# Patient Record
Sex: MALE | Race: OTHER
[De-identification: names, ages, dates, MRNs, and addresses within clinical notes are randomized per-mention and may not be internally consistent; named-entity substitution may affect disease eponyms.]

---

## 2018-01-18 ENCOUNTER — HOSPITAL ENCOUNTER (INPATIENT)
Dept: HOSPITAL 72 - 2E | Age: 68
LOS: 7 days | Discharge: ANOTHER HOSPITAL | DRG: 314 | End: 2018-01-25
Payer: MEDICARE

## 2018-01-18 VITALS — HEIGHT: 71 IN | WEIGHT: 157.44 LBS | BODY MASS INDEX: 22.04 KG/M2

## 2018-01-18 VITALS — SYSTOLIC BLOOD PRESSURE: 141 MMHG | DIASTOLIC BLOOD PRESSURE: 57 MMHG

## 2018-01-18 VITALS — DIASTOLIC BLOOD PRESSURE: 93 MMHG | SYSTOLIC BLOOD PRESSURE: 140 MMHG

## 2018-01-18 VITALS — DIASTOLIC BLOOD PRESSURE: 75 MMHG | SYSTOLIC BLOOD PRESSURE: 132 MMHG

## 2018-01-18 DIAGNOSIS — I48.92: ICD-10-CM

## 2018-01-18 DIAGNOSIS — I34.0: ICD-10-CM

## 2018-01-18 DIAGNOSIS — E78.5: ICD-10-CM

## 2018-01-18 DIAGNOSIS — I50.43: ICD-10-CM

## 2018-01-18 DIAGNOSIS — E11.22: ICD-10-CM

## 2018-01-18 DIAGNOSIS — N18.9: ICD-10-CM

## 2018-01-18 DIAGNOSIS — F14.10: ICD-10-CM

## 2018-01-18 DIAGNOSIS — Z23: ICD-10-CM

## 2018-01-18 DIAGNOSIS — I82.501: ICD-10-CM

## 2018-01-18 DIAGNOSIS — I36.1: ICD-10-CM

## 2018-01-18 DIAGNOSIS — N17.9: ICD-10-CM

## 2018-01-18 DIAGNOSIS — I13.0: ICD-10-CM

## 2018-01-18 DIAGNOSIS — I47.2: ICD-10-CM

## 2018-01-18 DIAGNOSIS — I42.9: Primary | ICD-10-CM

## 2018-01-18 DIAGNOSIS — Z79.84: ICD-10-CM

## 2018-01-18 LAB
ADD MANUAL DIFF: NO
ALBUMIN SERPL-MCNC: 3.3 G/DL (ref 3.4–5)
ALBUMIN/GLOB SERPL: 0.6 {RATIO} (ref 1–2.7)
ALP SERPL-CCNC: 152 U/L (ref 46–116)
ALT SERPL-CCNC: 167 U/L (ref 12–78)
ANION GAP SERPL CALC-SCNC: 13 MMOL/L (ref 5–15)
APTT BLD: 33 SEC (ref 23–33)
AST SERPL-CCNC: 156 U/L (ref 15–37)
BASOPHILS NFR BLD AUTO: 0.4 % (ref 0–2)
BILIRUB DIRECT SERPL-MCNC: 1.1 MG/DL (ref 0–0.3)
BILIRUB SERPL-MCNC: 1.7 MG/DL (ref 0.2–1)
BUN SERPL-MCNC: 88 MG/DL (ref 7–18)
CALCIUM SERPL-MCNC: 9.9 MG/DL (ref 8.5–10.1)
CHLORIDE SERPL-SCNC: 105 MMOL/L (ref 98–107)
CHOLEST SERPL-MCNC: 110 MG/DL (ref ?–200)
CK SERPL-CCNC: 186 U/L (ref 26–308)
CO2 SERPL-SCNC: 22 MMOL/L (ref 21–32)
CREAT SERPL-MCNC: 2.4 MG/DL (ref 0.55–1.3)
EOSINOPHIL NFR BLD AUTO: 0 % (ref 0–3)
ERYTHROCYTE [DISTWIDTH] IN BLOOD BY AUTOMATED COUNT: 17.5 % (ref 11.6–14.8)
GLOBULIN SER-MCNC: 5.3 G/DL
HCT VFR BLD CALC: 47.1 % (ref 42–52)
HDLC SERPL-MCNC: 21 MG/DL (ref 40–60)
HGB BLD-MCNC: 14.5 G/DL (ref 14.2–18)
INR PPP: 1.8 (ref 0.9–1.1)
LYMPHOCYTES NFR BLD AUTO: 22.5 % (ref 20–45)
MCV RBC AUTO: 92 FL (ref 80–99)
MONOCYTES NFR BLD AUTO: 5 % (ref 1–10)
NEUTROPHILS NFR BLD AUTO: 72 % (ref 45–75)
PLATELET # BLD: 174 K/UL (ref 150–450)
POTASSIUM SERPL-SCNC: 6.1 MMOL/L (ref 3.5–5.1)
RBC # BLD AUTO: 5.12 M/UL (ref 4.7–6.1)
SODIUM SERPL-SCNC: 142 MMOL/L (ref 136–145)
TRIGL SERPL-MCNC: 95 MG/DL (ref 30–150)
WBC # BLD AUTO: 6.9 K/UL (ref 4.8–10.8)

## 2018-01-18 PROCEDURE — 82977 ASSAY OF GGT: CPT

## 2018-01-18 PROCEDURE — 76700 US EXAM ABDOM COMPLETE: CPT

## 2018-01-18 PROCEDURE — 86140 C-REACTIVE PROTEIN: CPT

## 2018-01-18 PROCEDURE — 93306 TTE W/DOPPLER COMPLETE: CPT

## 2018-01-18 PROCEDURE — 80076 HEPATIC FUNCTION PANEL: CPT

## 2018-01-18 PROCEDURE — 90732 PPSV23 VACC 2 YRS+ SUBQ/IM: CPT

## 2018-01-18 PROCEDURE — 84484 ASSAY OF TROPONIN QUANT: CPT

## 2018-01-18 PROCEDURE — 85025 COMPLETE CBC W/AUTO DIFF WBC: CPT

## 2018-01-18 PROCEDURE — 71045 X-RAY EXAM CHEST 1 VIEW: CPT

## 2018-01-18 PROCEDURE — 83036 HEMOGLOBIN GLYCOSYLATED A1C: CPT

## 2018-01-18 PROCEDURE — 82248 BILIRUBIN DIRECT: CPT

## 2018-01-18 PROCEDURE — 86709 HEPATITIS A IGM ANTIBODY: CPT

## 2018-01-18 PROCEDURE — 83880 ASSAY OF NATRIURETIC PEPTIDE: CPT

## 2018-01-18 PROCEDURE — 36415 COLL VENOUS BLD VENIPUNCTURE: CPT

## 2018-01-18 PROCEDURE — 85730 THROMBOPLASTIN TIME PARTIAL: CPT

## 2018-01-18 PROCEDURE — 76775 US EXAM ABDO BACK WALL LIM: CPT

## 2018-01-18 PROCEDURE — 84300 ASSAY OF URINE SODIUM: CPT

## 2018-01-18 PROCEDURE — 80053 COMPREHEN METABOLIC PANEL: CPT

## 2018-01-18 PROCEDURE — 80307 DRUG TEST PRSMV CHEM ANLYZR: CPT

## 2018-01-18 PROCEDURE — 81001 URINALYSIS AUTO W/SCOPE: CPT

## 2018-01-18 PROCEDURE — 87340 HEPATITIS B SURFACE AG IA: CPT

## 2018-01-18 PROCEDURE — 94760 N-INVAS EAR/PLS OXIMETRY 1: CPT

## 2018-01-18 PROCEDURE — 84443 ASSAY THYROID STIM HORMONE: CPT

## 2018-01-18 PROCEDURE — 86705 HEP B CORE ANTIBODY IGM: CPT

## 2018-01-18 PROCEDURE — 82550 ASSAY OF CK (CPK): CPT

## 2018-01-18 PROCEDURE — 82962 GLUCOSE BLOOD TEST: CPT

## 2018-01-18 PROCEDURE — 93005 ELECTROCARDIOGRAM TRACING: CPT

## 2018-01-18 PROCEDURE — 80061 LIPID PANEL: CPT

## 2018-01-18 PROCEDURE — 83735 ASSAY OF MAGNESIUM: CPT

## 2018-01-18 PROCEDURE — 84133 ASSAY OF URINE POTASSIUM: CPT

## 2018-01-18 PROCEDURE — 80048 BASIC METABOLIC PNL TOTAL CA: CPT

## 2018-01-18 PROCEDURE — 84439 ASSAY OF FREE THYROXINE: CPT

## 2018-01-18 PROCEDURE — 93970 EXTREMITY STUDY: CPT

## 2018-01-18 PROCEDURE — 84550 ASSAY OF BLOOD/URIC ACID: CPT

## 2018-01-18 PROCEDURE — 89050 BODY FLUID CELL COUNT: CPT

## 2018-01-18 PROCEDURE — 84100 ASSAY OF PHOSPHORUS: CPT

## 2018-01-18 PROCEDURE — 85610 PROTHROMBIN TIME: CPT

## 2018-01-18 PROCEDURE — 86803 HEPATITIS C AB TEST: CPT

## 2018-01-18 RX ADMIN — TAMSULOSIN HYDROCHLORIDE SCH MG: 0.4 CAPSULE ORAL at 21:04

## 2018-01-18 RX ADMIN — INSULIN ASPART SCH UNITS: 100 INJECTION, SOLUTION INTRAVENOUS; SUBCUTANEOUS at 16:30

## 2018-01-18 RX ADMIN — METOPROLOL TARTRATE SCH MG: 50 TABLET, FILM COATED ORAL at 21:04

## 2018-01-18 RX ADMIN — ASPIRIN 81 MG SCH MG: 81 TABLET ORAL at 12:52

## 2018-01-18 RX ADMIN — METOPROLOL TARTRATE SCH MG: 50 TABLET, FILM COATED ORAL at 12:53

## 2018-01-18 RX ADMIN — INSULIN ASPART SCH UNITS: 100 INJECTION, SOLUTION INTRAVENOUS; SUBCUTANEOUS at 21:00

## 2018-01-18 NOTE — HISTORY & PHYSICAL
History and Physical


History & Physicial


Dictated for Int Med-Dr Mcclendon no. [id number].











TAMMIE HUFF Jan 18, 2018 19:34

## 2018-01-18 NOTE — CARDIAC ELECTROPHYSIOLOGY PN
Subjective


Subjective


[id number]





Objective





Last 24 Hour Vital Signs








  Date Time  Temp Pulse Resp B/P (MAP) Pulse Ox O2 Delivery O2 Flow Rate FiO2


 


1/18/18 12:53  121  132/75    


 


1/18/18 12:12 97.4 121 18 132/75 100 Room Air  


 


1/18/18 12:00  119      


 


1/18/18 08:26  121      











Laboratory Tests








Test


  1/18/18


10:48


 


White Blood Count


  6.9 K/UL


(4.8-10.8)


 


Red Blood Count


  5.12 M/UL


(4.70-6.10)


 


Hemoglobin


  14.5 G/DL


(14.2-18.0)


 


Hematocrit


  47.1 %


(42.0-52.0)


 


Mean Corpuscular Volume 92 FL (80-99)  


 


Mean Corpuscular Hemoglobin


  28.2 PG


(27.0-31.0)


 


Mean Corpuscular Hemoglobin


Concent 30.7 G/DL


(32.0-36.0)  L


 


Red Cell Distribution Width


  17.5 %


(11.6-14.8)  H


 


Platelet Count


  174 K/UL


(150-450)


 


Mean Platelet Volume


  8.0 FL


(6.5-10.1)


 


Neutrophils (%) (Auto)


  72.0 %


(45.0-75.0)


 


Lymphocytes (%) (Auto)


  22.5 %


(20.0-45.0)


 


Monocytes (%) (Auto)


  5.0 %


(1.0-10.0)


 


Eosinophils (%) (Auto)


  0.0 %


(0.0-3.0)


 


Basophils (%) (Auto)


  0.4 %


(0.0-2.0)


 


Prothrombin Time


  19.4 SEC


(9.30-11.50)  H


 


Prothromb Time International


Ratio 1.8 (0.9-1.1)


H


 


Activated Partial


Thromboplast Time 33 SEC (23-33)


 


 


Sodium Level


  142 MMOL/L


(136-145)


 


Potassium Level


  6.1 MMOL/L


(3.5-5.1)  *H


 


Chloride Level


  105 MMOL/L


()


 


Carbon Dioxide Level


  22 MMOL/L


(21-32)


 


Anion Gap


  13 mmol/L


(5-15)


 


Blood Urea Nitrogen


  88 mg/dL


(7-18)  H


 


Creatinine


  2.4 MG/DL


(0.55-1.30)  H


 


Estimat Glomerular Filtration


Rate 27.2 mL/min


(>60)


 


Glucose Level


  134 MG/DL


()  H


 


Uric Acid


  16.8 MG/DL


(2.6-7.2)  H


 


Calcium Level


  9.9 MG/DL


(8.5-10.1)


 


Total Bilirubin


  1.7 MG/DL


(0.2-1.0)  H


 


Direct Bilirubin


  1.1 MG/DL


(0.0-0.3)  H


 


Aspartate Amino Transf


(AST/SGOT) 156 U/L


(15-37)  H


 


Alanine Aminotransferase


(ALT/SGPT) 167 U/L


(12-78)  H


 


Alkaline Phosphatase


  152 U/L


()  H


 


Total Creatine Kinase


  186 U/L


()


 


Troponin I


  0.991 ng/mL


(0.000-0.056)


 


Total Protein


  8.6 G/DL


(6.4-8.2)  H


 


Albumin


  3.3 G/DL


(3.4-5.0)  L


 


Globulin 5.3 g/dL  


 


Albumin/Globulin Ratio


  0.6 (1.0-2.7)


L


 


Triglycerides Level


  95 MG/DL


()


 


Cholesterol Level


  110 MG/DL (<


200)


 


LDL Cholesterol


  73 mg/dL


(<100)


 


HDL Cholesterol


  21 MG/DL


(40-60)  L


 


Cholesterol/HDL Ratio


  5.2 (3.3-4.4)


H


 


Thyroid Stimulating Hormone


(TSH) 6.994 uiU/mL


(0.358-3.740)

















ANNA IBRAHIM Jan 18, 2018 16:28

## 2018-01-18 NOTE — CONSULTATION
Consult Note


Consult Note


asked to eval for renal failure and hyperkalemia





67 year old male with hx of dm, was taken to [facility] with CC of increasing 

swelling of legs and dyspnea.  He was found to be in atrial flutter and had LLL 

effuion on CXR.  He received cardizem IV and PO with NaCL bolus in [place] and 

transferred to [facility] for further management.





Aspirin (Aspirin), 81 MG PO DAILY, (Reported)


Atorvastatin Calcium* (Atorvastatin Calcium*), 20 MG ORAL BEDTIME, (Reported)


Metformin Hcl* (Metformin Hcl*), 500 MG ORAL TWICE A DAY, (Reported)





patient transfered fro [place]





K 6.1 - 


BUN/Cr  88./2.4





.


Assessment/Plan





Imp:





Renal failure- Acute vs Chronic OR Acute superimposed on chronic


DM ? Nephropathy


Atrial Flutter-


Pleural effusion / Bilateral LE edema











Plan:





UA


Ynes


U Eos


slow Hydrate


2D Echo


Kidney RICHARD


flomax


gastric support


monitor renal parameters


Avoid Nephrotoxics


Hold Metformin


DC statins in view of high LFTs











IVANA CHAIDEZ Jan 18, 2018 16:24
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
## 2018-01-18 NOTE — DIAGNOSTIC IMAGING REPORT
Indication: Dyspnea

 

Comparison:  None

 

A single view chest radiograph was obtained.

 

Findings:

 

There is atelectasis at the left lung base with slight elevation of the diaphragm.

The heart is enlarged. Bones are osteopenic. [place] foreign bodies are projected

over the left upper quadrant abdomen.

 

IMPRESSION:

 

Left basal atelectasis versus scarring.

 

Cardiomegaly.

## 2018-01-18 NOTE — CONSULTATION
History of Present Illness


General


Date patient seen:  Jan 18, 2018


Chief Complaint:  dyspnea


Referring physician:  Dr. Mcclendon


Reason for Consultation:  Dyspnea





Present Illness


HPI


67 year old male with hx of dm, was taken to [facility] with CC of increasing 

swelling of legs and dyspnea.  He was found to be in atrial flutter and had LLL 

effuion on CXR.  He received cardizem IV and PO with NaCL bolus in [place] and 

transferred to [facility] for further management.


Allergies:  


Coded Allergies:  


     NO KNOWN ALLERGIES (Verified  Allergy, Unknown, 1/18/18)





Medication History


Scheduled


Aspirin (Aspirin), 81 MG PO DAILY, (Reported)


Atorvastatin Calcium* (Atorvastatin Calcium*), 20 MG ORAL BEDTIME, (Reported)


Metformin Hcl* (Metformin Hcl*), 500 MG ORAL TWICE A DAY, (Reported)





Patient History


Healthcare decision maker





Resuscitation status


Full Code


Advanced Directive on File








Past Medical/Surgical History


Past Medical/Surgical History:  


(1) Diabetes mellitus





Review of Systems


Constitutional:  Reports: no symptoms


Eye:  Reports: no symptoms


Respiratory:  Reports: no symptoms


Skin:  Reports: no symptoms





Physical Exam


Physical Exam Narrative


General Appearance:  cachetic


Lines, tubes and drains:  peripheral


HEENT:  normocephalic, anicteric


Neck:  non-tender, normal alignment


Respiratory/Chest:  chest wall non-tender, lungs clear


Breasts:  no masses


Cardiovascular/Chest:  normal peripheral pulses, normal rate


Abdomen:  normal bowel sounds, hyperactive bowel sounds


Genitourinary/Rectal:  normal genital exam


Extremities:  large edema





Last 24 Hour Vital Signs








  Date Time  Temp Pulse Resp B/P (MAP) Pulse Ox O2 Delivery O2 Flow Rate FiO2


 


1/18/18 12:12 97.4 121 18 132/75 100 Room Air  











Laboratory Tests








Test


  1/18/18


10:48


 


White Blood Count


  6.9 K/UL


(4.8-10.8)


 


Red Blood Count


  5.12 M/UL


(4.70-6.10)


 


Hemoglobin


  14.5 G/DL


(14.2-18.0)


 


Hematocrit


  47.1 %


(42.0-52.0)


 


Mean Corpuscular Volume 92 FL (80-99)  


 


Mean Corpuscular Hemoglobin


  28.2 PG


(27.0-31.0)


 


Mean Corpuscular Hemoglobin


Concent 30.7 G/DL


(32.0-36.0)  L


 


Red Cell Distribution Width


  17.5 %


(11.6-14.8)  H


 


Platelet Count


  174 K/UL


(150-450)


 


Mean Platelet Volume


  8.0 FL


(6.5-10.1)


 


Neutrophils (%) (Auto)


  72.0 %


(45.0-75.0)


 


Lymphocytes (%) (Auto)


  22.5 %


(20.0-45.0)


 


Monocytes (%) (Auto)


  5.0 %


(1.0-10.0)


 


Eosinophils (%) (Auto)


  0.0 %


(0.0-3.0)


 


Basophils (%) (Auto)


  0.4 %


(0.0-2.0)


 


Prothrombin Time


  19.4 SEC


(9.30-11.50)  H


 


Prothromb Time International


Ratio 1.8 (0.9-1.1)


H


 


Activated Partial


Thromboplast Time 33 SEC (23-33)


 


 


Sodium Level


  142 MMOL/L


(136-145)


 


Potassium Level


  6.1 MMOL/L


(3.5-5.1)  *H


 


Chloride Level


  105 MMOL/L


()


 


Carbon Dioxide Level


  22 MMOL/L


(21-32)


 


Anion Gap


  13 mmol/L


(5-15)


 


Blood Urea Nitrogen


  88 mg/dL


(7-18)  H


 


Creatinine


  2.4 MG/DL


(0.55-1.30)  H


 


Estimat Glomerular Filtration


Rate 27.2 mL/min


(>60)


 


Glucose Level


  134 MG/DL


()  H


 


Uric Acid Pending  


 


Calcium Level


  9.9 MG/DL


(8.5-10.1)


 


Total Bilirubin


  1.7 MG/DL


(0.2-1.0)  H


 


Direct Bilirubin


  1.1 MG/DL


(0.0-0.3)  H


 


Aspartate Amino Transf


(AST/SGOT) 156 U/L


(15-37)  H


 


Alanine Aminotransferase


(ALT/SGPT) 167 U/L


(12-78)  H


 


Alkaline Phosphatase


  152 U/L


()  H


 


Total Creatine Kinase Pending  


 


Troponin I


  0.991 ng/mL


(0.000-0.056)


 


Total Protein


  8.6 G/DL


(6.4-8.2)  H


 


Albumin


  3.3 G/DL


(3.4-5.0)  L


 


Globulin 5.3 g/dL  


 


Albumin/Globulin Ratio


  0.6 (1.0-2.7)


L


 


Triglycerides Level


  95 MG/DL


()


 


Cholesterol Level


  110 MG/DL (<


200)


 


LDL Cholesterol


  73 mg/dL


(<100)


 


HDL Cholesterol


  21 MG/DL


(40-60)  L


 


Cholesterol/HDL Ratio


  5.2 (3.3-4.4)


H


 


Thyroid Stimulating Hormone


(TSH) 6.994 uiU/mL


(0.358-3.740)








Height (Feet):  5


Height (Inches):  11.00


Weight (Pounds):  174


Medications





Current Medications








 Medications


  (Trade)  Dose


 Ordered  Sig/Alice


 Route


 PRN Reason  Start Time


 Stop Time Status Last Admin


Dose Admin


 


 Acetaminophen


  (Tylenol)  650 mg  Q6H  PRN


 ORAL


 Mild Pain/Temp > 100.5  1/18/18 12:00


 2/17/18 11:59   


 


 


 Aspirin


  (ASA)  81 mg  DAILY


 ORAL


   1/18/18 12:30


 2/17/18 12:29   


 


 


 Atorvastatin


 Calcium


  (Lipitor)  20 mg  BEDTIME


 ORAL


   1/18/18 21:00


 2/17/18 20:59   


 


 


 Dextrose


  (Dextrose 50%)    STAT  PRN


 IV


 Hypoglycemia  1/18/18 12:00


 2/17/18 11:59   


 


 


 Insulin Aspart


  (NovoLOG)    BEFORE MEALS AND  HS


 SUBQ


   1/18/18 16:30


 2/17/18 16:29   


 


 


 Metformin HCl


  (Glucophage)  500 mg  BID


 ORAL


   1/18/18 12:45


 2/17/18 12:44 UNV  


 


 


 Metoprolol


 Tartrate


  (Lopressor)  50 mg  Q12HR


 ORAL


   1/18/18 12:00


 2/17/18 11:59 UNV  


 


 


 Ondansetron HCl


  (Zofran)  4 mg  Q4H  PRN


 IVP


 Nausea & Vomiting  1/18/18 12:00


 2/17/18 11:59   


 


 


 Rivaroxaban


  (Xarelto)  20 mg  DAILY


 ORAL


   1/19/18 12:30


 2/18/18 12:29   


 











Assessment/Plan


Problem List:  


(1) Pleural effusion


ICD Codes:  J90 - Pleural effusion, not elsewhere classified


SNOMED:  80035573


(2) Atrial flutter


ICD Codes:  I48.92 - Unspecified atrial flutter


SNOMED:  1662475


(3) Diabetes mellitus


ICD Codes:  E11.9 - Type 2 diabetes mellitus without complications


SNOMED:  35005257


Assessment/Plan


check echo


renal evaluation


Kayexalate for hyperkalemai


sliding scale


echo


cardio to see.











JAVID CHAVARRIA Jan 18, 2018 12:38

## 2018-01-19 VITALS — DIASTOLIC BLOOD PRESSURE: 78 MMHG | SYSTOLIC BLOOD PRESSURE: 120 MMHG

## 2018-01-19 VITALS — DIASTOLIC BLOOD PRESSURE: 85 MMHG | SYSTOLIC BLOOD PRESSURE: 118 MMHG

## 2018-01-19 VITALS — DIASTOLIC BLOOD PRESSURE: 78 MMHG | SYSTOLIC BLOOD PRESSURE: 110 MMHG

## 2018-01-19 VITALS — SYSTOLIC BLOOD PRESSURE: 128 MMHG | DIASTOLIC BLOOD PRESSURE: 94 MMHG

## 2018-01-19 VITALS — DIASTOLIC BLOOD PRESSURE: 84 MMHG | SYSTOLIC BLOOD PRESSURE: 128 MMHG

## 2018-01-19 VITALS — SYSTOLIC BLOOD PRESSURE: 133 MMHG | DIASTOLIC BLOOD PRESSURE: 90 MMHG

## 2018-01-19 LAB
ADD MANUAL DIFF: NO
ALBUMIN SERPL-MCNC: 3.1 G/DL (ref 3.4–5)
ALBUMIN/GLOB SERPL: 0.8 {RATIO} (ref 1–2.7)
ALP SERPL-CCNC: 141 U/L (ref 46–116)
ALT SERPL-CCNC: 212 U/L (ref 12–78)
ANION GAP SERPL CALC-SCNC: 17 MMOL/L (ref 5–15)
APPEARANCE UR: CLEAR
APTT PPP: YELLOW S
AST SERPL-CCNC: 218 U/L (ref 15–37)
BASOPHILS NFR BLD AUTO: 0.5 % (ref 0–2)
BILIRUB DIRECT SERPL-MCNC: 1.1 MG/DL (ref 0–0.3)
BILIRUB SERPL-MCNC: 1.6 MG/DL (ref 0.2–1)
BUN SERPL-MCNC: 99 MG/DL (ref 7–18)
CALCIUM SERPL-MCNC: 9.3 MG/DL (ref 8.5–10.1)
CHLORIDE SERPL-SCNC: 106 MMOL/L (ref 98–107)
CK SERPL-CCNC: 196 U/L (ref 26–308)
CO2 SERPL-SCNC: 19 MMOL/L (ref 21–32)
CREAT SERPL-MCNC: 2.2 MG/DL (ref 0.55–1.3)
EOSINOPHIL NFR BLD AUTO: 0.1 % (ref 0–3)
ERYTHROCYTE [DISTWIDTH] IN BLOOD BY AUTOMATED COUNT: 17.1 % (ref 11.6–14.8)
GAMMA GLUTAMYL TRANSPEPTIDASE: 87 U/L (ref 5–85)
GLOBULIN SER-MCNC: 4.1 G/DL
GLUCOSE UR STRIP-MCNC: NEGATIVE MG/DL
HCT VFR BLD CALC: 42.5 % (ref 42–52)
HGB BLD-MCNC: 13.4 G/DL (ref 14.2–18)
KETONES UR QL STRIP: NEGATIVE
LEUKOCYTE ESTERASE UR QL STRIP: NEGATIVE
LYMPHOCYTES NFR BLD AUTO: 26.8 % (ref 20–45)
MCV RBC AUTO: 91 FL (ref 80–99)
MONOCYTES NFR BLD AUTO: 8.1 % (ref 1–10)
NEUTROPHILS NFR BLD AUTO: 64.6 % (ref 45–75)
NITRITE UR QL STRIP: NEGATIVE
PH UR STRIP: 5 [PH] (ref 4.5–8)
PHOSPHATE SERPL-MCNC: 5.2 MG/DL (ref 2.5–4.9)
PLATELET # BLD: 143 K/UL (ref 150–450)
POTASSIUM SERPL-SCNC: 4.5 MMOL/L (ref 3.5–5.1)
PROT UR QL STRIP: NEGATIVE
RBC # BLD AUTO: 4.68 M/UL (ref 4.7–6.1)
SODIUM SERPL-SCNC: 142 MMOL/L (ref 136–145)
SP GR UR STRIP: 1.02 (ref 1–1.03)
UROBILINOGEN UR-MCNC: 1 MG/DL (ref 0–1)
WBC # BLD AUTO: 6.9 K/UL (ref 4.8–10.8)

## 2018-01-19 RX ADMIN — TAMSULOSIN HYDROCHLORIDE SCH MG: 0.4 CAPSULE ORAL at 22:23

## 2018-01-19 RX ADMIN — INSULIN ASPART SCH UNITS: 100 INJECTION, SOLUTION INTRAVENOUS; SUBCUTANEOUS at 21:00

## 2018-01-19 RX ADMIN — DOCUSATE SODIUM SCH MG: 100 CAPSULE, LIQUID FILLED ORAL at 17:04

## 2018-01-19 RX ADMIN — ASPIRIN 81 MG SCH MG: 81 TABLET ORAL at 08:09

## 2018-01-19 RX ADMIN — DOCUSATE SODIUM SCH MG: 100 CAPSULE, LIQUID FILLED ORAL at 17:03

## 2018-01-19 RX ADMIN — INSULIN ASPART SCH UNITS: 100 INJECTION, SOLUTION INTRAVENOUS; SUBCUTANEOUS at 11:25

## 2018-01-19 RX ADMIN — INSULIN ASPART SCH UNITS: 100 INJECTION, SOLUTION INTRAVENOUS; SUBCUTANEOUS at 06:40

## 2018-01-19 RX ADMIN — METOPROLOL TARTRATE SCH MG: 50 TABLET, FILM COATED ORAL at 08:09

## 2018-01-19 RX ADMIN — INSULIN ASPART SCH UNITS: 100 INJECTION, SOLUTION INTRAVENOUS; SUBCUTANEOUS at 16:19

## 2018-01-19 NOTE — INTERNAL MED PROGRESS NOTE
Subjective


Physician Name


Dejon Mcclendon


Attending Physician


Dejon Mcclendon MD





Current Medications








 Medications


  (Trade)  Dose


 Ordered  Sig/Alice


 Route


 PRN Reason  Start Time


 Stop Time Status Last Admin


Dose Admin


 


 Acetaminophen


  (Tylenol)  650 mg  Q6H  PRN


 ORAL


 Mild Pain/Temp > 100.5  1/18/18 12:00


 2/17/18 11:59  1/18/18 12:52


 


 


 Allopurinol


  (Allopurinol)  300 mg  DAILY


 ORAL


   1/19/18 09:00


 2/18/18 08:59  1/19/18 08:09


 


 


 Apixaban


  (Eliquis)  2.5 mg  BID


 ORAL


   1/20/18 09:00


 2/19/18 08:59   


 


 


 Aspirin


  (ASA)  81 mg  DAILY


 ORAL


   1/18/18 12:30


 2/17/18 12:29  1/19/18 08:09


 


 


 Dextrose


  (Dextrose 50%)    STAT  PRN


 IV


 Hypoglycemia  1/18/18 12:00


 2/17/18 11:59   


 


 


 Docusate Sodium


  (Colace)  100 mg  THREE TIMES A  DAY


 ORAL


   1/19/18 18:00


 2/18/18 17:59   


 


 


 Furosemide


  (Lasix)  80 mg  EVERY 12  HOURS


 IV


   1/18/18 21:00


 2/17/18 20:59  1/19/18 08:09


 


 


 Insulin Aspart


  (NovoLOG)    BEFORE MEALS AND  HS


 SUBQ


   1/18/18 16:30


 2/17/18 16:29  1/19/18 06:40


 


 


 Lansoprazole


  (Prevacid)  30 mg  DAILY


 ORAL


   1/18/18 17:15


 2/17/18 17:14  1/19/18 08:09


 


 


 Metoprolol


 Tartrate


  (Lopressor)  100 mg  Q12HR


 ORAL


   1/19/18 21:00


 2/18/18 20:59   


 


 


 Ondansetron HCl


  (Zofran)  4 mg  Q4H  PRN


 IVP


 Nausea & Vomiting  1/18/18 12:00


 2/17/18 11:59   


 


 


 Sevelamer


 Carbonate


  (Renvela)  800 mg  THREE TIMES A  DAY


 ORAL


   1/19/18 18:00


 2/18/18 17:59   


 


 


 Tamsulosin HCl


  (Flomax)  0.4 mg  BEDTIME


 ORAL


   1/18/18 21:00


 2/17/18 20:59  1/18/18 21:04


 








Allergies:  


Coded Allergies:  


     NO KNOWN ALLERGIES (Verified  Allergy, Unknown, 1/18/18)


Subjective


awake, responsive, SOB upon ambulation





Objective





Last Vital Signs








  Date Time  Temp Pulse Resp B/P (MAP) Pulse Ox O2 Delivery O2 Flow Rate FiO2


 


1/19/18 13:03 97.3 80 20 110/78 95   


 


1/18/18 16:00      Room Air  











Laboratory Tests








Test


  1/19/18


06:00


 


White Blood Count


  6.9 K/UL


(4.8-10.8)


 


Red Blood Count


  4.68 M/UL


(4.70-6.10)  L


 


Hemoglobin


  13.4 G/DL


(14.2-18.0)  L


 


Hematocrit


  42.5 %


(42.0-52.0)


 


Mean Corpuscular Volume 91 FL (80-99)  


 


Mean Corpuscular Hemoglobin


  28.7 PG


(27.0-31.0)


 


Mean Corpuscular Hemoglobin


Concent 31.6 G/DL


(32.0-36.0)  L


 


Red Cell Distribution Width


  17.1 %


(11.6-14.8)  H


 


Platelet Count


  143 K/UL


(150-450)  L


 


Mean Platelet Volume


  7.5 FL


(6.5-10.1)


 


Neutrophils (%) (Auto)


  64.6 %


(45.0-75.0)


 


Lymphocytes (%) (Auto)


  26.8 %


(20.0-45.0)


 


Monocytes (%) (Auto)


  8.1 %


(1.0-10.0)


 


Eosinophils (%) (Auto)


  0.1 %


(0.0-3.0)


 


Basophils (%) (Auto)


  0.5 %


(0.0-2.0)


 


Urine Color Yellow  


 


Urine Appearance Clear  


 


Urine pH 5 (4.5-8.0)  


 


Urine Specific Gravity


  1.020


(1.005-1.035)


 


Urine Protein


  Negative


(NEGATIVE)


 


Urine Glucose (UA)


  Negative


(NEGATIVE)


 


Urine Ketones


  Negative


(NEGATIVE)


 


Urine Occult Blood


  Negative


(NEGATIVE)


 


Urine Nitrite


  Negative


(NEGATIVE)


 


Urine Bilirubin


  Negative


(NEGATIVE)


 


Urine Urobilinogen


  1 MG/DL


(0.0-1.0)  H


 


Urine Leukocyte Esterase


  Negative


(NEGATIVE)


 


Urine RBC


  0 /HPF (0 - 0)


 


 


Urine WBC


  0-2 /HPF (0 -


0)


 


Urine Squamous Epithelial


Cells Occasional


/LPF


 


Urine Bacteria


  Occasional


/HPF (NONE)


 


Urine Eosinophils None seen  


 


Urine Random Sodium


  23 MEQ/L


()


 


Urine Potassium Timed


  58 mmol/L


(12-62)


 


Sodium Level


  142 MMOL/L


(136-145)


 


Potassium Level


  4.5 MMOL/L


(3.5-5.1)


 


Chloride Level


  106 MMOL/L


()


 


Carbon Dioxide Level


  19 MMOL/L


(21-32)  L


 


Anion Gap


  17 mmol/L


(5-15)  H


 


Blood Urea Nitrogen


  99 mg/dL


(7-18)  H


 


Creatinine


  2.2 MG/DL


(0.55-1.30)  H


 


Estimat Glomerular Filtration


Rate 30.1 mL/min


(>60)


 


Glucose Level


  114 MG/DL


()  H


 


Hemoglobin A1c


  6.2 %


(4.3-6.0)  H


 


Calcium Level


  9.3 MG/DL


(8.5-10.1)


 


Phosphorus Level


  5.2 MG/DL


(2.5-4.9)  H


 


Magnesium Level


  2.6 MG/DL


(1.8-2.4)  H


 


Total Bilirubin


  1.6 MG/DL


(0.2-1.0)  H


 


Direct Bilirubin


  1.1 MG/DL


(0.0-0.3)  H


 


Gamma Glutamyl Transpeptidase


  87 U/L (5-85)


H


 


Aspartate Amino Transf


(AST/SGOT) 218 U/L


(15-37)  H


 


Alanine Aminotransferase


(ALT/SGPT) 212 U/L


(12-78)  H


 


Alkaline Phosphatase


  141 U/L


()  H


 


Total Creatine Kinase


  196 U/L


()


 


Troponin I


  0.741 ng/mL


(0.000-0.056)


 


Pro-B-Type Natriuretic Peptide


  91664 pg/mL


(0-125)  H


 


Total Protein


  7.2 G/DL


(6.4-8.2)


 


Albumin


  3.1 G/DL


(3.4-5.0)  L


 


Globulin 4.1 g/dL  


 


Albumin/Globulin Ratio


  0.8 (1.0-2.7)


L


 


Urine Opiates Screen


  Negative


(NEGATIVE)


 


Urine Barbiturates Screen


  Negative


(NEGATIVE)


 


Phencyclidine (PCP) Screen


  Negative


(NEGATIVE)


 


Urine Amphetamines Screen


  Negative


(NEGATIVE)


 


Urine Benzodiazepines Screen


  Negative


(NEGATIVE)


 


Urine Cocaine Screen


  Positive


(NEGATIVE)  H


 


Urine Marijuana (THC) Screen


  Negative


(NEGATIVE)

















Intake and Output  


 


 1/18/18 1/19/18





 19:00 07:00


 


Intake Total 476 ml 


 


Output Total  600 ml


 


Balance 476 ml -600 ml


 


  


 


Intake Oral 476 ml 


 


Output Urine Total  600 ml


 


# Voids 1 








Objective


General: No acute distress, awake and alert


HEENT: NCAT, sclera anicteric, PERRL, EOMI.


Neck: Supple, no significant jugular venous distention, 


Lungs: Good inspiratory effort, no accessory muscle use, no Wheeze or Rales.


Heart: Irregular rate and rhythm, normal S1/S2, no murmurs


Abdomen: soft, nontender, nondistended. Normoactive bowel sounds.


 / Rectal: Refused and deferred.


Extremities: No Cyanosis , clubbing, +2 edema. 


Neuro: A&O x 3, Able to move all extremities


Skin: warm, no rashes or lesions


Psych: Normal mood and affect





Assessment/Plan


Assessment/Plan


WU on chronic renal failure


Cardiomyopathy


Atrial flutter


Pleural effusion


Diabetes mellitus


DM Nephropathy


Pleural effusion / Bilateral LE edema


Cardiomyopathy





Plan:





Lasix 80mg IV bid


Eliquis Bid


Avoid Nephrotoxics


Hold Metformin


DC statins in view of high LFTs


Monitor Labs











Dejon Mcclendon MD Jan 19, 2018 15:39

## 2018-01-19 NOTE — CARDIOLOGY REPORT
--------------- APPROVED REPORT --------------





EKG Measurement

Heart [id number]

GA [id number]

[id number]

[id number]

[id number]





Atrial flutter with 2:1 AV conduction

Lateral infarct, age undetermined

Abnormal ECG

## 2018-01-19 NOTE — CONSULTATION
DATE OF CONSULTATION:  01/18/2018



CARDIOLOGY CONSULTATION



REASON FOR CONSULTATION:  Newly diagnosed severe cardiomyopathy, ejection

fraction of 20% as well as atrial flutter with rapid ventricular

response.



HISTORY OF PRESENT ILLNESS:  The patient is a 67-year-old 

gentleman who usually goes to [facility] with no prior history of

coronary artery disease or congestive heart failure, was taken by

paramedics to [facility] for increasing lower extremity edema as well

as shortness of breath.  The patient was found to be in atrial flutter

with rapid ventricular response and received intravenous Cardizem and was

then transferred to [facility] for further evaluation and

management.  His heart rate at [place] was 130 beats per minute with

classic P-waves.  The patient underwent echocardiogram at [facility] which showed ejection fraction of only 20% to 25%.  He denies prior

myocardial infarction or coronary artery disease.



PAST MEDICAL HISTORY:

1. Hypertension.

2. Diabetes.



SOCIAL HISTORY:  He lives at home.  Does not smoke or drink alcohol.  He

used to do cocaine for many years, but stopped 10 months ago.



FAMILY HISTORY:  Noncontributory.



REVIEW OF SYSTEMS:  Review of systems was performed and was negative other

than what was mentioned in the history of present illness.



PHYSICAL EXAMINATION:

VITAL SIGNS:  Blood pressure of 132/75, pulse 121, respirations 18, and

temperature 97.4.

HEAD AND NECK:  Positive jugular venous distention.

LUNGS:  Decreased breath sounds.

CARDIOVASCULAR:  Irregularly irregular S1 and S2 with no gallop or murmur.

 

ABDOMEN:  Soft.

EXTREMITIES:  There is 3+ pitting edema.



LABORATORY AND DIAGNOSTIC DATA:  White count of 6.9, hemoglobin 14.5,

hematocrit 47.1, and platelet count 174.  Sodium 140, potassium 6.1, BUN

of 88, creatinine 2.4, and glucose of 134.  Troponin is 0.991.



ASSESSMENT AND PLAN:

1. Troponin 0.991.  This is likely due to the patient's renal failure and

congestive heart failure.  His creatinine was 2.4.  We will completely

rule out myocardial infarction protocol.  Continue on Lipitor, metoprolol,

and aspirin.  The patient will likely need cardiac catheterization in view

of severely newly diagnosed cardiomyopathy _____03:09.

2. Newly diagnosed cardiomyopathy, ejection fraction of only 20%.  The

patient _____03:14 swelling in his legs in the last three weeks.  We will

start on Lasix 80 mg IV b.i.d., and watch his renal function.  He may need

much higher dosage.  Hold off on lisinopril and Aldactone in view of renal

failure and hyperkalemia.  If heart rate is better established, he may

need a dobutamine drip at this point _____04:05 probably going to make

rapid ventricular response even worse.

3. Atrial flutter with rapid ventricular response.  Increase metoprolol

to 100 mg b.i.d. and add digoxin to his medical regimen.  The patient is

already on Xarelto, eventually would benefit from atrial flutter

ablation.

4. History of cocaine use in the past.  We will check urine toxicology

screen again.

5. Renal failure.  Further evaluation by Dr. Gutiérrez.

6. Diabetes.

7. Hyperlipidemia.



Thank you very much, Dr. Mcclendon, for allowing me to participate in the

care of this patient.  Please do not hesitate to contact me for any

questions regarding my evaluation.









  ______________________________________________

  Sukumar Landaverde M.D.





DR:  NATALIE

D:  01/18/2018 16:28

T:  01/18/2018 18:55

JOB#:  [id number]

CC:

## 2018-01-19 NOTE — NEPHROLOGY PROGRESS NOTE
Assessment/Plan


Problem List:  


(1) Acute on chronic renal failure


(2) Cardiomyopathy


(3) Atrial flutter


(4) Pleural effusion


(5) Diabetes mellitus


Assessment








Renal failure- Acute vs Chronic OR Acute superimposed on chronic


DM ? Nephropathy


Atrial Flutter-


Pleural effusion / Bilateral LE edema


Cardiomyopathy


Plan


Plan:





UA


Ynes


U Eos


diuresis


optimize cardiac status


2D Echo 20% EjFx


Kidney RICHARD WNL


flomax


gastric support


monitor renal parameters


Avoid Nephrotoxics


Hold Metformin


DC statins in view of high LFTs





Subjective


ROS Limited/Unobtainable:  No


Constitutional:  Reports: malaise





Objective


Objective





Last 24 Hour Vital Signs








  Date Time  Temp Pulse Resp B/P (MAP) Pulse Ox O2 Delivery O2 Flow Rate FiO2


 


1/19/18 13:03 97.3 80 20 110/78 95   


 


1/19/18 11:53  76      


 


1/19/18 08:09  78  120/78    


 


1/19/18 07:45 97.3 78 20 120/78 95   


 


1/19/18 07:43  81      


 


1/19/18 04:00  77      


 


1/19/18 04:00 97.3 76 20 118/85 95   


 


1/19/18 00:00 97.2 64 18 113/84 100   


 


1/19/18 00:00  99      


 


1/18/18 21:04  74  142/62    


 


1/18/18 20:00 97.0 70 20 141/57 90   


 


1/18/18 20:00  99      


 


1/18/18 18:21  114      


 


1/18/18 16:00 96.5 117 18 140/93 100 Room Air  


 


1/18/18 16:00  118      

















Intake and Output  


 


 1/18/18 1/19/18





 19:00 07:00


 


Intake Total 476 ml 


 


Output Total  600 ml


 


Balance 476 ml -600 ml


 


  


 


Intake Oral 476 ml 


 


Output Urine Total  600 ml


 


# Voids 1 








Laboratory Tests


1/19/18 06:00: 


White Blood Count 6.9, Red Blood Count 4.68L, Hemoglobin 13.4L, Hematocrit 42.5

, Mean Corpuscular Volume 91, Mean Corpuscular Hemoglobin 28.7, Mean 

Corpuscular Hemoglobin Concent 31.6L, Red Cell Distribution Width 17.1H, 

Platelet Count 143L, Mean Platelet Volume 7.5, Neutrophils (%) (Auto) 64.6, 

Lymphocytes (%) (Auto) 26.8, Monocytes (%) (Auto) 8.1, Eosinophils (%) (Auto) 

0.1, Basophils (%) (Auto) 0.5, Urine Color Yellow, Urine Appearance Clear, 

Urine pH 5, Urine Specific Gravity 1.020, Urine Protein Negative, Urine Glucose 

(UA) Negative, Urine Ketones Negative, Urine Occult Blood Negative, Urine 

Nitrite Negative, Urine Bilirubin Negative, Urine Urobilinogen 1H, Urine 

Leukocyte Esterase Negative, Urine RBC 0, Urine WBC 0-2, Urine Squamous 

Epithelial Cells Occasional, Urine Bacteria Occasional, Urine Eosinophils None 

seen, Urine Random Sodium 23, Urine Potassium Timed 58, Sodium Level 142, 

Potassium Level 4.5, Chloride Level 106, Carbon Dioxide Level 19L, Anion Gap 17H

, Blood Urea Nitrogen 99H, Creatinine 2.2H, Estimat Glomerular Filtration Rate 

30.1, Glucose Level 114H, Hemoglobin A1c 6.2H, Calcium Level 9.3, Phosphorus 

Level 5.2H, Magnesium Level 2.6H, Total Bilirubin 1.6H, Direct Bilirubin 1.1H, 

Gamma Glutamyl Transpeptidase 87H, Aspartate Amino Transf (AST/SGOT) 218H, 

Alanine Aminotransferase (ALT/SGPT) 212H, Alkaline Phosphatase 141H, Total 

Creatine Kinase 196, Troponin I 0.741H, Pro-B-Type Natriuretic Peptide 99958M, 

Total Protein 7.2, Albumin 3.1L, Globulin 4.1, Albumin/Globulin Ratio 0.8L, 

Urine Opiates Screen Negative, Urine Barbiturates Screen Negative, 

Phencyclidine (PCP) Screen Negative, Urine Amphetamines Screen Negative, Urine 

Benzodiazepines Screen Negative, Urine Cocaine Screen PositiveH, Urine 

Marijuana (THC) Screen Negative


Height (Feet):  5


Height (Inches):  11.00


Weight (Pounds):  174


Cardiovascular:  normal rate


Respiratory/Chest:  decreased breath sounds


Abdomen:  distended











IVANA CHAIDEZ Jan 19, 2018 15:27

## 2018-01-19 NOTE — CARDIAC ELECTROPHYSIOLOGY PN
Assessment/Plan


Assessment/Plan


1. Troponin leak.  This is likely due to the patient's renal failure and


congestive heart failure.  His creatinine was 2.4.   Continue on Lipitor, 

metoprolol,


and aspirin. Likely need cardiac catheterization in view of severely newly 

diagnosed cardiomyopathy.





2. Newly diagnosed cardiomyopathy, ejection fraction of only 20%.  Continue 

Lasix 80 mg IV b.i.d. and Lopressor


   Hold off on lisinopril and Aldactone in view of renal


failure and hyperkalemia.  In flutter rate 76.





3. Atrial flutter with rapid ventricular response.HR better on metoprolol 100 

mg b.i.d. .  Change to Eliquis 2.5 bid.  





4. History of cocaine use.  Urine Tox screen still positive


5. Renal failure.  Further evaluation by Dr. Gutiérrez.


6. Diabetes.


7. Hyperlipidemia.





Subjective


Subjective


Feeling better with diuresis. Just HAd LE doppler





Objective





Last 24 Hour Vital Signs








  Date Time  Temp Pulse Resp B/P (MAP) Pulse Ox O2 Delivery O2 Flow Rate FiO2


 


1/19/18 13:03 97.3 80 20 110/78 95   


 


1/19/18 11:53  76      


 


1/19/18 08:09  78  120/78    


 


1/19/18 07:45 97.3 78 20 120/78 95   


 


1/19/18 07:43  81      


 


1/19/18 04:00  77      


 


1/19/18 04:00 97.3 76 20 118/85 95   


 


1/19/18 00:00 97.2 64 18 113/84 100   


 


1/19/18 00:00  99      


 


1/18/18 21:04  74  142/62    


 


1/18/18 20:00 97.0 70 20 141/57 90   


 


1/18/18 20:00  99      


 


1/18/18 18:21  114      


 


1/18/18 16:00 96.5 117 18 140/93 100 Room Air  


 


1/18/18 16:00  118      

















Intake and Output  


 


 1/18/18 1/19/18





 19:00 07:00


 


Intake Total 476 ml 


 


Output Total  600 ml


 


Balance 476 ml -600 ml


 


  


 


Intake Oral 476 ml 


 


Output Urine Total  600 ml


 


# Voids 1 











Laboratory Tests








Test


  1/19/18


06:00


 


White Blood Count


  6.9 K/UL


(4.8-10.8)


 


Red Blood Count


  4.68 M/UL


(4.70-6.10)  L


 


Hemoglobin


  13.4 G/DL


(14.2-18.0)  L


 


Hematocrit


  42.5 %


(42.0-52.0)


 


Mean Corpuscular Volume 91 FL (80-99)  


 


Mean Corpuscular Hemoglobin


  28.7 PG


(27.0-31.0)


 


Mean Corpuscular Hemoglobin


Concent 31.6 G/DL


(32.0-36.0)  L


 


Red Cell Distribution Width


  17.1 %


(11.6-14.8)  H


 


Platelet Count


  143 K/UL


(150-450)  L


 


Mean Platelet Volume


  7.5 FL


(6.5-10.1)


 


Neutrophils (%) (Auto)


  64.6 %


(45.0-75.0)


 


Lymphocytes (%) (Auto)


  26.8 %


(20.0-45.0)


 


Monocytes (%) (Auto)


  8.1 %


(1.0-10.0)


 


Eosinophils (%) (Auto)


  0.1 %


(0.0-3.0)


 


Basophils (%) (Auto)


  0.5 %


(0.0-2.0)


 


Urine Color Yellow  


 


Urine Appearance Clear  


 


Urine pH 5 (4.5-8.0)  


 


Urine Specific Gravity


  1.020


(1.005-1.035)


 


Urine Protein


  Negative


(NEGATIVE)


 


Urine Glucose (UA)


  Negative


(NEGATIVE)


 


Urine Ketones


  Negative


(NEGATIVE)


 


Urine Occult Blood


  Negative


(NEGATIVE)


 


Urine Nitrite


  Negative


(NEGATIVE)


 


Urine Bilirubin


  Negative


(NEGATIVE)


 


Urine Urobilinogen


  1 MG/DL


(0.0-1.0)  H


 


Urine Leukocyte Esterase


  Negative


(NEGATIVE)


 


Urine RBC


  0 /HPF (0 - 0)


 


 


Urine WBC


  0-2 /HPF (0 -


0)


 


Urine Squamous Epithelial


Cells Occasional


/LPF


 


Urine Bacteria


  Occasional


/HPF (NONE)


 


Urine Eosinophils None seen  


 


Urine Random Sodium


  23 MEQ/L


()


 


Urine Potassium Timed


  58 mmol/L


(12-62)


 


Sodium Level


  142 MMOL/L


(136-145)


 


Potassium Level


  4.5 MMOL/L


(3.5-5.1)


 


Chloride Level


  106 MMOL/L


()


 


Carbon Dioxide Level


  19 MMOL/L


(21-32)  L


 


Anion Gap


  17 mmol/L


(5-15)  H


 


Blood Urea Nitrogen


  99 mg/dL


(7-18)  H


 


Creatinine


  2.2 MG/DL


(0.55-1.30)  H


 


Estimat Glomerular Filtration


Rate 30.1 mL/min


(>60)


 


Glucose Level


  114 MG/DL


()  H


 


Hemoglobin A1c


  6.2 %


(4.3-6.0)  H


 


Calcium Level


  9.3 MG/DL


(8.5-10.1)


 


Phosphorus Level


  5.2 MG/DL


(2.5-4.9)  H


 


Magnesium Level


  2.6 MG/DL


(1.8-2.4)  H


 


Total Bilirubin


  1.6 MG/DL


(0.2-1.0)  H


 


Direct Bilirubin


  1.1 MG/DL


(0.0-0.3)  H


 


Gamma Glutamyl Transpeptidase


  87 U/L (5-85)


H


 


Aspartate Amino Transf


(AST/SGOT) 218 U/L


(15-37)  H


 


Alanine Aminotransferase


(ALT/SGPT) 212 U/L


(12-78)  H


 


Alkaline Phosphatase


  141 U/L


()  H


 


Total Creatine Kinase


  196 U/L


()


 


Troponin I


  0.741 ng/mL


(0.000-0.056)


 


Pro-B-Type Natriuretic Peptide


  80193 pg/mL


(0-125)  H


 


Total Protein


  7.2 G/DL


(6.4-8.2)


 


Albumin


  3.1 G/DL


(3.4-5.0)  L


 


Globulin 4.1 g/dL  


 


Albumin/Globulin Ratio


  0.8 (1.0-2.7)


L


 


Urine Opiates Screen


  Negative


(NEGATIVE)


 


Urine Barbiturates Screen


  Negative


(NEGATIVE)


 


Phencyclidine (PCP) Screen


  Negative


(NEGATIVE)


 


Urine Amphetamines Screen


  Negative


(NEGATIVE)


 


Urine Benzodiazepines Screen


  Negative


(NEGATIVE)


 


Urine Cocaine Screen


  Positive


(NEGATIVE)  H


 


Urine Marijuana (THC) Screen


  Negative


(NEGATIVE)








Objective


HEAD AND NECK:  Positive jugular venous distention.


LUNGS:  Decreased breath sounds.


CARDIOVASCULAR:  Irregularly irregular S1 and S2 with no gallop or murmur.


ABDOMEN:  Soft.


EXTREMITIES:  There is 3+ pitting edema.











ANNA IBRAHIM Jan 19, 2018 13:15

## 2018-01-19 NOTE — PULMONOLOGY PROGRESS NOTE
Assessment/Plan


Problems:  


(1) Pleural effusion


(2) Atrial flutter


(3) Diabetes mellitus


(4) EF 20%


Assessment/Plan


f/u renal function


still has Vtach


cardio recommendations


adjust cardiac meds.


d/w Dr. Landaverde





Subjective


ROS Limited/Unobtainable:  No


Allergies:  


Coded Allergies:  


     NO KNOWN ALLERGIES (Verified  Allergy, Unknown, 1/18/18)





Objective





Last 24 Hour Vital Signs








  Date Time  Temp Pulse Resp B/P (MAP) Pulse Ox O2 Delivery O2 Flow Rate FiO2


 


1/19/18 16:39  78      


 


1/19/18 16:00 97.3 79 20 133/90 95 Room Air  


 


1/19/18 13:03 97.3 80 20 110/78 95   


 


1/19/18 11:53  76      


 


1/19/18 08:09  78  120/78    


 


1/19/18 07:45 97.3 78 20 120/78 95   


 


1/19/18 07:43  81      


 


1/19/18 04:00  77      


 


1/19/18 04:00 97.3 76 20 118/85 95   


 


1/19/18 00:00 97.2 64 18 113/84 100   


 


1/19/18 00:00  99      


 


1/18/18 21:04  74  142/62    


 


1/18/18 20:00 97.0 70 20 141/57 90   


 


1/18/18 20:00  99      

















Intake and Output  


 


 1/18/18 1/19/18





 19:00 07:00


 


Intake Total 476 ml 


 


Output Total  600 ml


 


Balance 476 ml -600 ml


 


  


 


Intake Oral 476 ml 


 


Output Urine Total  600 ml


 


# Voids 1 








General Appearance:  cachetic


HEENT:  normocephalic, atraumatic


Respiratory/Chest:  chest wall non-tender, lungs clear


Cardiovascular:  normal peripheral pulses, normal rate, no JVD


Abdomen:  normal bowel sounds, soft, non tender


Genitourinary:  normal external genitalia


Extremities:  no cyanosis


Skin:  no rash, no ulcers


Neurologic/Psychiatric:  CNs II-XII grossly normal


Lymphatic:  no neck adenopathy


Laboratory Tests


1/19/18 06:00: 


White Blood Count 6.9, Red Blood Count 4.68L, Hemoglobin 13.4L, Hematocrit 42.5

, Mean Corpuscular Volume 91, Mean Corpuscular Hemoglobin 28.7, Mean 

Corpuscular Hemoglobin Concent 31.6L, Red Cell Distribution Width 17.1H, 

Platelet Count 143L, Mean Platelet Volume 7.5, Neutrophils (%) (Auto) 64.6, 

Lymphocytes (%) (Auto) 26.8, Monocytes (%) (Auto) 8.1, Eosinophils (%) (Auto) 

0.1, Basophils (%) (Auto) 0.5, Urine Color Yellow, Urine Appearance Clear, 

Urine pH 5, Urine Specific Gravity 1.020, Urine Protein Negative, Urine Glucose 

(UA) Negative, Urine Ketones Negative, Urine Occult Blood Negative, Urine 

Nitrite Negative, Urine Bilirubin Negative, Urine Urobilinogen 1H, Urine 

Leukocyte Esterase Negative, Urine RBC 0, Urine WBC 0-2, Urine Squamous 

Epithelial Cells Occasional, Urine Bacteria Occasional, Urine Eosinophils None 

seen, Urine Random Sodium 23, Urine Potassium Timed 58, Sodium Level 142, 

Potassium Level 4.5, Chloride Level 106, Carbon Dioxide Level 19L, Anion Gap 17H

, Blood Urea Nitrogen 99H, Creatinine 2.2H, Estimat Glomerular Filtration Rate 

30.1, Glucose Level 114H, Hemoglobin A1c 6.2H, Calcium Level 9.3, Phosphorus 

Level 5.2H, Magnesium Level 2.6H, Total Bilirubin 1.6H, Direct Bilirubin 1.1H, 

Gamma Glutamyl Transpeptidase 87H, Aspartate Amino Transf (AST/SGOT) 218H, 

Alanine Aminotransferase (ALT/SGPT) 212H, Alkaline Phosphatase 141H, Total 

Creatine Kinase 196, Troponin I 0.741H, Pro-B-Type Natriuretic Peptide 10126H, 

Total Protein 7.2, Albumin 3.1L, Globulin 4.1, Albumin/Globulin Ratio 0.8L, 

Urine Opiates Screen Negative, Urine Barbiturates Screen Negative, 

Phencyclidine (PCP) Screen Negative, Urine Amphetamines Screen Negative, Urine 

Benzodiazepines Screen Negative, Urine Cocaine Screen PositiveH, Urine 

Marijuana (THC) Screen Negative





Current Medications








 Medications


  (Trade)  Dose


 Ordered  Sig/Alice


 Route


 PRN Reason  Start Time


 Stop Time Status Last Admin


Dose Admin


 


 Acetaminophen


  (Tylenol)  650 mg  Q6H  PRN


 ORAL


 Mild Pain/Temp > 100.5  1/18/18 12:00


 2/17/18 11:59  1/18/18 12:52


 


 


 Allopurinol


  (Allopurinol)  300 mg  DAILY


 ORAL


   1/19/18 09:00


 2/18/18 08:59  1/19/18 08:09


 


 


 Apixaban


  (Eliquis)  2.5 mg  BID


 ORAL


   1/20/18 09:00


 2/19/18 08:59   


 


 


 Aspirin


  (ASA)  81 mg  DAILY


 ORAL


   1/18/18 12:30


 2/17/18 12:29  1/19/18 08:09


 


 


 Dextrose


  (Dextrose 50%)    STAT  PRN


 IV


 Hypoglycemia  1/18/18 12:00


 2/17/18 11:59  1/19/18 16:18


 


 


 Docusate Sodium


  (Colace)  100 mg  THREE TIMES A  DAY


 ORAL


   1/19/18 18:00


 2/18/18 17:59  1/19/18 17:04


 


 


 Furosemide


  (Lasix)  80 mg  EVERY 12  HOURS


 IV


   1/18/18 21:00


 2/17/18 20:59  1/19/18 08:09


 


 


 Insulin Aspart


  (NovoLOG)    BEFORE MEALS AND  HS


 SUBQ


   1/18/18 16:30


 2/17/18 16:29  1/19/18 06:40


 


 


 Lansoprazole


  (Prevacid)  30 mg  DAILY


 ORAL


   1/18/18 17:15


 2/17/18 17:14  1/19/18 08:09


 


 


 Metoprolol


 Tartrate


  (Lopressor)  100 mg  Q12HR


 ORAL


   1/19/18 21:00


 2/18/18 20:59   


 


 


 Ondansetron HCl


  (Zofran)  4 mg  Q4H  PRN


 IVP


 Nausea & Vomiting  1/18/18 12:00


 2/17/18 11:59   


 


 


 Sevelamer


 Carbonate


  (Renvela)  800 mg  THREE TIMES A  DAY


 ORAL


   1/19/18 18:00


 2/18/18 17:59  1/19/18 17:03


 


 


 Tamsulosin HCl


  (Flomax)  0.4 mg  BEDTIME


 ORAL


   1/18/18 21:00


 2/17/18 20:59  1/18/18 21:04


 

















JAVID CHAVARRIA Jan 19, 2018 19:13

## 2018-01-19 NOTE — DIAGNOSTIC IMAGING REPORT
Indication: Abnormal renal function

 

Technique: Multiplanar grayscale and color Doppler imaging of the kidneys and bladder

 

Comparison: None

 

Findings: 

Renal size is symmetric, with both kidneys measuring approximately 11 cm in length.

Both kidneys demonstrate normal parenchymal thickness and echogenicity. No evidence

of hydronephrosis or sonographically appreciable renal stones bilaterally. There is a

anechoic well-circumscribed structure in the left kidney measuring 2.8 cm compatible

with a simple renal cyst. Bladder is unremarkable in appearance. Prostate normal in

size. Imaged portions of the liver unremarkable.

 

Impression: 

No urinary tract stone or hydronephrosis bilaterally.

 

Renal parenchymal echogenicity within normal limits.

 

Simple cyst in the left kidney.

## 2018-01-20 VITALS — SYSTOLIC BLOOD PRESSURE: 139 MMHG | DIASTOLIC BLOOD PRESSURE: 84 MMHG

## 2018-01-20 VITALS — DIASTOLIC BLOOD PRESSURE: 91 MMHG | SYSTOLIC BLOOD PRESSURE: 125 MMHG

## 2018-01-20 VITALS — SYSTOLIC BLOOD PRESSURE: 128 MMHG | DIASTOLIC BLOOD PRESSURE: 85 MMHG

## 2018-01-20 VITALS — SYSTOLIC BLOOD PRESSURE: 134 MMHG | DIASTOLIC BLOOD PRESSURE: 68 MMHG

## 2018-01-20 VITALS — SYSTOLIC BLOOD PRESSURE: 133 MMHG | DIASTOLIC BLOOD PRESSURE: 95 MMHG

## 2018-01-20 VITALS — SYSTOLIC BLOOD PRESSURE: 128 MMHG | DIASTOLIC BLOOD PRESSURE: 68 MMHG

## 2018-01-20 VITALS — DIASTOLIC BLOOD PRESSURE: 82 MMHG | SYSTOLIC BLOOD PRESSURE: 119 MMHG

## 2018-01-20 LAB
ADD MANUAL DIFF: NO
ADD MANUAL DIFF: NO
ALBUMIN SERPL-MCNC: 2.9 G/DL (ref 3.4–5)
ALBUMIN/GLOB SERPL: 0.6 {RATIO} (ref 1–2.7)
ALP SERPL-CCNC: 145 U/L (ref 46–116)
ALT SERPL-CCNC: 214 U/L (ref 12–78)
ANION GAP SERPL CALC-SCNC: 12 MMOL/L (ref 5–15)
AST SERPL-CCNC: 179 U/L (ref 15–37)
BASOPHILS NFR BLD AUTO: 0.4 % (ref 0–2)
BASOPHILS NFR BLD AUTO: 0.5 % (ref 0–2)
BILIRUB DIRECT SERPL-MCNC: 0.9 MG/DL (ref 0–0.3)
BILIRUB SERPL-MCNC: 1.5 MG/DL (ref 0.2–1)
BUN SERPL-MCNC: 89 MG/DL (ref 7–18)
CALCIUM SERPL-MCNC: 9.5 MG/DL (ref 8.5–10.1)
CHLORIDE SERPL-SCNC: 105 MMOL/L (ref 98–107)
CO2 SERPL-SCNC: 29 MMOL/L (ref 21–32)
CREAT SERPL-MCNC: 2 MG/DL (ref 0.55–1.3)
EOSINOPHIL NFR BLD AUTO: 0 % (ref 0–3)
EOSINOPHIL NFR BLD AUTO: 0 % (ref 0–3)
ERYTHROCYTE [DISTWIDTH] IN BLOOD BY AUTOMATED COUNT: 17.5 % (ref 11.6–14.8)
ERYTHROCYTE [DISTWIDTH] IN BLOOD BY AUTOMATED COUNT: 17.8 % (ref 11.6–14.8)
GLOBULIN SER-MCNC: 4.9 G/DL
HCT VFR BLD CALC: 46.2 % (ref 42–52)
HCT VFR BLD CALC: 47.8 % (ref 42–52)
HGB BLD-MCNC: 14.4 G/DL (ref 14.2–18)
HGB BLD-MCNC: 14.7 G/DL (ref 14.2–18)
LYMPHOCYTES NFR BLD AUTO: 14.2 % (ref 20–45)
LYMPHOCYTES NFR BLD AUTO: 14.4 % (ref 20–45)
MCV RBC AUTO: 91 FL (ref 80–99)
MCV RBC AUTO: 92 FL (ref 80–99)
MONOCYTES NFR BLD AUTO: 6.2 % (ref 1–10)
MONOCYTES NFR BLD AUTO: 6.9 % (ref 1–10)
NEUTROPHILS NFR BLD AUTO: 78.1 % (ref 45–75)
NEUTROPHILS NFR BLD AUTO: 79.2 % (ref 45–75)
PHOSPHATE SERPL-MCNC: 4.5 MG/DL (ref 2.5–4.9)
PLATELET # BLD: 126 K/UL (ref 150–450)
PLATELET # BLD: 144 K/UL (ref 150–450)
POTASSIUM SERPL-SCNC: 4.2 MMOL/L (ref 3.5–5.1)
RBC # BLD AUTO: 5.03 M/UL (ref 4.7–6.1)
RBC # BLD AUTO: 5.26 M/UL (ref 4.7–6.1)
SODIUM SERPL-SCNC: 146 MMOL/L (ref 136–145)
WBC # BLD AUTO: 7.1 K/UL (ref 4.8–10.8)
WBC # BLD AUTO: 8.1 K/UL (ref 4.8–10.8)

## 2018-01-20 RX ADMIN — INSULIN ASPART SCH UNITS: 100 INJECTION, SOLUTION INTRAVENOUS; SUBCUTANEOUS at 06:30

## 2018-01-20 RX ADMIN — INSULIN ASPART SCH UNITS: 100 INJECTION, SOLUTION INTRAVENOUS; SUBCUTANEOUS at 16:30

## 2018-01-20 RX ADMIN — TAMSULOSIN HYDROCHLORIDE SCH MG: 0.4 CAPSULE ORAL at 20:59

## 2018-01-20 RX ADMIN — DOCUSATE SODIUM SCH MG: 100 CAPSULE, LIQUID FILLED ORAL at 17:51

## 2018-01-20 RX ADMIN — APIXABAN SCH MG: 2.5 TABLET, FILM COATED ORAL at 08:37

## 2018-01-20 RX ADMIN — INSULIN ASPART SCH UNITS: 100 INJECTION, SOLUTION INTRAVENOUS; SUBCUTANEOUS at 21:00

## 2018-01-20 RX ADMIN — DOCUSATE SODIUM SCH MG: 100 CAPSULE, LIQUID FILLED ORAL at 13:00

## 2018-01-20 RX ADMIN — ASPIRIN 81 MG SCH MG: 81 TABLET ORAL at 08:38

## 2018-01-20 RX ADMIN — INSULIN ASPART SCH UNITS: 100 INJECTION, SOLUTION INTRAVENOUS; SUBCUTANEOUS at 13:10

## 2018-01-20 RX ADMIN — DOCUSATE SODIUM SCH MG: 100 CAPSULE, LIQUID FILLED ORAL at 08:38

## 2018-01-20 RX ADMIN — APIXABAN SCH MG: 2.5 TABLET, FILM COATED ORAL at 17:51

## 2018-01-20 NOTE — PULMONOLOGY PROGRESS NOTE
Assessment/Plan


Assessment/Plan


ASSESSMENT


Atrial flutter with RVR


Elevated troponin troponin leak ( due to renal failure)


severe cardiomyopathy ( EF 20-25%)


systolic and diastolic heart failure 


acute on chronic renal failure 


cocaine abuse 


moderate MR and TR 


elevated TSH 


transaminitis 





PLAN OF CARE


Tele 


cardio follows 


IV diuresis monitor cardiorenal parameters, volumes


Medical management of CHF with Lasix, BB, 


hold ACE 2 to renal failure


Per cardio patient will need heart cath due to newly diagnosed CM ( [facility]) 


Placed on statin, ASA, BB


Rate control with BB, a/coagulation with Eliquis


ECHO with EF 20-25% and RVSP of 22, moderate MR and TR


Nephro follows acute vs chronic vs acute superimposed on chronic renal failure


Renal US essentially negative 


creat trending down  


monitor renal parameters, lytes, correct as needed, avoid nephrotoxic


Flomax started 


GI prophylaxis


due to severely elevated LFT statin was dc 1/19/18 


Metformin on hold  2 to renal failure


BS management with SS of insulin 


urine tox screen +cocaine 


 on abstinence from street drug 


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 


discussed with the patient, 


patient was not aware prior to this admission that he had weak heart   


check free T4 





case discussed and evaluated by supervising physician





Subjective


Allergies:  


Coded Allergies:  


     NO KNOWN ALLERGIES (Verified  Allergy, Unknown, 1/18/18)


Subjective


denies chest pain, SOB





Objective





Last 24 Hour Vital Signs








  Date Time  Temp Pulse Resp B/P (MAP) Pulse Ox O2 Delivery O2 Flow Rate FiO2


 


1/20/18 04:00  79      


 


1/20/18 04:00 98.0 81 16 128/85 95   


 


1/20/18 00:00 97.0 78 18 125/91 90   


 


1/20/18 00:00  78      


 


1/19/18 22:24  81  128/94    


 


1/19/18 20:00 97.0 81 20 128/94 93   


 


1/19/18 20:00  80      


 


1/19/18 16:39  78      


 


1/19/18 16:00 97.3 79 20 133/90 95 Room Air  


 


1/19/18 13:03 97.3 80 20 110/78 95   


 


1/19/18 11:53  76      


 


1/19/18 08:09  78  120/78    


 


1/19/18 07:45 97.3 78 20 120/78 95   


 


1/19/18 07:43  81      

















Intake and Output  


 


 1/19/18 1/20/18





 19:00 07:00


 


Intake Total 550 ml 


 


Output Total  350 ml


 


Balance 550 ml -350 ml


 


  


 


Intake Oral 550 ml 


 


Output Urine Total  350 ml


 


# Voids  2


 


# Bowel Movements  1








General Appearance:  no acute distress


HEENT:  normocephalic, atraumatic, anicteric, mucous membranes moist


Respiratory/Chest:  lungs clear, no respiratory distress, no accessory muscle 

use


Cardiovascular:  normal rate - A flutter on tle


Abdomen:  normal bowel sounds, soft, non tender, non distended


Extremities:  no edema, pedal pulses normal


Neurologic/Psychiatric:  no motor/sensory deficits, alert, oriented x 3, 

responsive


Musculoskeletal:  normal muscle bulk





Current Medications








 Medications


  (Trade)  Dose


 Ordered  Sig/Alice


 Route


 PRN Reason  Start Time


 Stop Time Status Last Admin


Dose Admin


 


 Acetaminophen


  (Tylenol)  650 mg  Q6H  PRN


 ORAL


 Mild Pain/Temp > 100.5  1/18/18 12:00


 2/17/18 11:59  1/18/18 12:52


 


 


 Allopurinol


  (Allopurinol)  300 mg  DAILY


 ORAL


   1/19/18 09:00


 2/18/18 08:59  1/19/18 08:09


 


 


 Apixaban


  (Eliquis)  2.5 mg  BID


 ORAL


   1/20/18 09:00


 2/19/18 08:59   


 


 


 Aspirin


  (ASA)  81 mg  DAILY


 ORAL


   1/18/18 12:30


 2/17/18 12:29  1/19/18 08:09


 


 


 Dextrose


  (Dextrose 50%)    STAT  PRN


 IV


 Hypoglycemia  1/18/18 12:00


 2/17/18 11:59  1/19/18 16:18


 


 


 Docusate Sodium


  (Colace)  100 mg  THREE TIMES A  DAY


 ORAL


   1/19/18 18:00


 2/18/18 17:59  1/19/18 17:04


 


 


 Furosemide


  (Lasix)  80 mg  EVERY 12  HOURS


 IV


   1/18/18 21:00


 2/17/18 20:59  1/19/18 22:24


 


 


 Insulin Aspart


  (NovoLOG)    BEFORE MEALS AND  HS


 SUBQ


   1/18/18 16:30


 2/17/18 16:29  1/19/18 06:40


 


 


 Lansoprazole


  (Prevacid)  30 mg  DAILY


 ORAL


   1/18/18 17:15


 2/17/18 17:14  1/19/18 08:09


 


 


 Metoprolol


 Tartrate


  (Lopressor)  100 mg  Q12HR


 ORAL


   1/19/18 21:00


 2/18/18 20:59  1/19/18 22:24


 


 


 Ondansetron HCl


  (Zofran)  4 mg  Q4H  PRN


 IVP


 Nausea & Vomiting  1/18/18 12:00


 2/17/18 11:59   


 


 


 Sevelamer


 Carbonate


  (Renvela)  800 mg  THREE TIMES A  DAY


 ORAL


   1/19/18 18:00


 2/18/18 17:59  1/19/18 17:03


 


 


 Tamsulosin HCl


  (Flomax)  0.4 mg  BEDTIME


 ORAL


   1/18/18 21:00


 2/17/18 20:59  1/19/18 22:23


 

















Simon ([facility])Kaitlynn NP Jan 20, 2018 07:27

## 2018-01-20 NOTE — CARDIAC ELECTROPHYSIOLOGY PN
Assessment/Plan


Assessment/Plan


1. Troponin leak.  Due to renal failure and congestive heart failure.  His 

creatinine was 2.4.   Continue on Lipitor, metoprolol,


and aspirin. Likely need cardiac catheterization in view of also severely newly 

diagnosed cardiomyopathy if renal fx improves.





2. Newly diagnosed cardiomyopathy, ejection fraction of only 20%.  BNP > 58286. 

Continue Lasix 80 mg IV b.i.d. and Lopressor


   Hold off on lisinopril and Aldactone in view of renal failure and 

hyperkalemia.  In flutter rate 76.





3. Atrial flutter with rapid ventricular response.HR better on metoprolol 100 

mg b.i.d. and Eliquis 2.5 bid.  





4. History of cocaine use.  Urine Tox screen still positive





5. Renal failure.  Further evaluation by Dr. Gutiérrez.


6. Diabetes.


7. Hyperlipidemia.





Subjective


Subjective


Still in flutter but rate better controlled.





Objective





Last 24 Hour Vital Signs








  Date Time  Temp Pulse Resp B/P (MAP) Pulse Ox O2 Delivery O2 Flow Rate FiO2


 


1/20/18 12:00 97.0 77 21 128/68 96 Room Air  





        


 


1/20/18 10:00 97.0 81 22 119/82 96 Room Air  





        


 


1/20/18 08:37  80  133/95    


 


1/20/18 08:00  80      


 


1/20/18 08:00 97.0 80 19 133/95 95 Room Air  


 


1/20/18 04:00  79      


 


1/20/18 04:00 98.0 81 16 128/85 95   


 


1/20/18 00:00 97.0 78 18 125/91 90   


 


1/20/18 00:00  78      


 


1/19/18 22:24  81  128/94    


 


1/19/18 20:00 97.0 81 20 128/94 93   


 


1/19/18 20:00  80      


 


1/19/18 16:39  78      


 


1/19/18 16:00 97.3 79 20 133/90 95 Room Air  

















Intake and Output  


 


 1/19/18 1/20/18





 19:00 07:00


 


Intake Total 550 ml 


 


Output Total  350 ml


 


Balance 550 ml -350 ml


 


  


 


Intake Oral 550 ml 


 


Output Urine Total  350 ml


 


# Voids  2


 


# Bowel Movements  1











Laboratory Tests








Test


  1/20/18


06:42 1/20/18


12:41


 


White Blood Count


  7.1 K/UL


(4.8-10.8) Pending  


 


 


Red Blood Count


  5.03 M/UL


(4.70-6.10) Pending  


 


 


Hemoglobin


  14.4 G/DL


(14.2-18.0) Pending  


 


 


Hematocrit


  46.2 %


(42.0-52.0) Pending  


 


 


Mean Corpuscular Volume 92 FL (80-99)   Pending  


 


Mean Corpuscular Hemoglobin


  28.7 PG


(27.0-31.0) Pending  


 


 


Mean Corpuscular Hemoglobin


Concent 31.2 G/DL


(32.0-36.0)  L Pending  


 


 


Red Cell Distribution Width


  17.8 %


(11.6-14.8)  H Pending  


 


 


Platelet Count


  126 K/UL


(150-450)  L Pending  


 


 


Mean Platelet Volume


  7.2 FL


(6.5-10.1) Pending  


 


 


Neutrophils (%) (Auto)


  78.1 %


(45.0-75.0)  H Pending  


 


 


Lymphocytes (%) (Auto)


  14.4 %


(20.0-45.0)  L Pending  


 


 


Monocytes (%) (Auto)


  6.9 %


(1.0-10.0) Pending  


 


 


Eosinophils (%) (Auto)


  0.0 %


(0.0-3.0) Pending  


 


 


Basophils (%) (Auto)


  0.5 %


(0.0-2.0) Pending  


 


 


Sodium Level


  146 MMOL/L


(136-145)  H 


 


 


Potassium Level


  4.2 MMOL/L


(3.5-5.1) 


 


 


Chloride Level


  105 MMOL/L


() 


 


 


Carbon Dioxide Level


  29 MMOL/L


(21-32) 


 


 


Anion Gap


  12 mmol/L


(5-15) 


 


 


Blood Urea Nitrogen


  89 mg/dL


(7-18)  H 


 


 


Creatinine


  2.0 MG/DL


(0.55-1.30)  H 


 


 


Estimat Glomerular Filtration


Rate 33.6 mL/min


(>60) 


 


 


Glucose Level


  101 MG/DL


() 


 


 


Uric Acid


  16.8 MG/DL


(2.6-7.2)  H 


 


 


Calcium Level


  9.5 MG/DL


(8.5-10.1) 


 


 


Phosphorus Level


  4.5 MG/DL


(2.5-4.9) 


 


 


Magnesium Level


  2.1 MG/DL


(1.8-2.4) 


 


 


Total Bilirubin


  1.5 MG/DL


(0.2-1.0)  H 


 


 


Direct Bilirubin


  0.9 MG/DL


(0.0-0.3)  H 


 


 


Aspartate Amino Transf


(AST/SGOT) 179 U/L


(15-37)  H 


 


 


Alanine Aminotransferase


(ALT/SGPT) 214 U/L


(12-78)  H 


 


 


Alkaline Phosphatase


  145 U/L


()  H 


 


 


Pro-B-Type Natriuretic Peptide


  > 94346 pg/mL


(0-125)  H 


 


 


Total Protein


  7.8 G/DL


(6.4-8.2) 


 


 


Albumin


  2.9 G/DL


(3.4-5.0)  L 


 


 


Globulin 4.9 g/dL   


 


Albumin/Globulin Ratio


  0.6 (1.0-2.7)


L 


 








Objective


HEAD AND NECK:  Positive jugular venous distention.


LUNGS:  Decreased breath sounds.


CARDIOVASCULAR:  Irregularly irregular S1 and S2 with no gallop or murmur.


ABDOMEN:  Soft.


EXTREMITIES:  There is 3+ pitting edema.











ANNA IBRAHIM Jan 20, 2018 13:27

## 2018-01-20 NOTE — INTERNAL MED PROGRESS NOTE
Subjective


Date of Service:  Jan 20, 2018


Physician Name


Kendall Huff


Attending Physician


Dejon Mcclendon MD





Current Medications








 Medications


  (Trade)  Dose


 Ordered  Sig/Alice


 Route


 PRN Reason  Start Time


 Stop Time Status Last Admin


Dose Admin


 


 Acetaminophen


  (Tylenol)  650 mg  Q6H  PRN


 ORAL


 Mild Pain/Temp > 100.5  1/18/18 12:00


 2/17/18 11:59  1/18/18 12:52


 


 


 Allopurinol


  (Allopurinol)  300 mg  DAILY


 ORAL


   1/19/18 09:00


 2/18/18 08:59  1/20/18 08:38


 


 


 Apixaban


  (Eliquis)  2.5 mg  BID


 ORAL


   1/20/18 09:00


 2/19/18 08:59  1/20/18 08:37


 


 


 Aspirin


  (ASA)  81 mg  DAILY


 ORAL


   1/18/18 12:30


 2/17/18 12:29  1/20/18 08:38


 


 


 Dextrose


  (Dextrose 50%)    STAT  PRN


 IV


 Hypoglycemia  1/18/18 12:00


 2/17/18 11:59  1/19/18 16:18


 


 


 Docusate Sodium


  (Colace)  100 mg  THREE TIMES A  DAY


 ORAL


   1/19/18 18:00


 2/18/18 17:59  1/19/18 17:04


 


 


 Furosemide


  (Lasix)  80 mg  EVERY 12  HOURS


 IV


   1/18/18 21:00


 2/17/18 20:59  1/20/18 08:38


 


 


 Insulin Aspart


  (NovoLOG)    BEFORE MEALS AND  HS


 SUBQ


   1/18/18 16:30


 2/17/18 16:29  1/19/18 06:40


 


 


 Lansoprazole


  (Prevacid)  30 mg  DAILY


 ORAL


   1/18/18 17:15


 2/17/18 17:14  1/20/18 08:38


 


 


 Metoprolol


 Tartrate


  (Lopressor)  100 mg  Q12HR


 ORAL


   1/19/18 21:00


 2/18/18 20:59  1/20/18 08:37


 


 


 Ondansetron HCl


  (Zofran)  4 mg  Q4H  PRN


 IVP


 Nausea & Vomiting  1/18/18 12:00


 2/17/18 11:59   


 


 


 Sevelamer


 Carbonate


  (Renvela)  800 mg  THREE TIMES A  DAY


 ORAL


   1/19/18 18:00


 2/18/18 17:59  1/20/18 08:37


 


 


 Tamsulosin HCl


  (Flomax)  0.4 mg  BEDTIME


 ORAL


   1/18/18 21:00


 2/17/18 20:59  1/19/18 22:23


 








Allergies:  


Coded Allergies:  


     NO KNOWN ALLERGIES (Verified  Allergy, Unknown, 1/18/18)


ROS Limited/Unobtainable:  No


Constitutional:  Reports: no symptoms


HEENT:  Reports: no symptoms


Cardiovascular:  Reports: irregular heart rate


Respiratory:  Reports: shortness of breath


Gastrointestinal/Abdominal:  Reports: no symptoms


Genitourinary:  Reports: no symptoms


Neurologic/Psychiatric:  Reports: no symptoms


Subjective


68 YO M admitted with shortness of breath.  Now new cardiomyopathy, CHF and 

renal failure.  Await transfer to [facility] for cardiac cath.  Cover for 

[facility]-Dr Mcclendon.





Objective





Last Vital Signs








  Date Time  Temp Pulse Resp B/P (MAP) Pulse Ox O2 Delivery O2 Flow Rate FiO2


 


1/20/18 08:37  80  133/95    


 


1/20/18 08:00 97.0  19  95 Room Air  








General Appearance:  WD/WN, no apparent distress, alert


EENT:  PERRL/EOMI, normal ENT inspection


Neck:  non-tender, normal alignment, supple, normal inspection


Cardiovascular:  normal peripheral pulses, no gallop/murmur, no JVD, 

irregularly irregular


Respiratory/Chest:  chest wall non-tender, no accessory muscle use, respiratory 

distress, crackles/rales, rhonchi - bilaterally, expiratory wheezing


Abdomen:  normal bowel sounds, non tender, soft, no organomegaly, no mass


Extremities:  normal range of motion


Edema:  mild edema


Neurologic:  CNs II-XII grossly normal, no motor/sensory deficits


Skin:  normal pigmentation, warm/dry





Laboratory Tests








Test


  1/20/18


06:42


 


White Blood Count


  7.1 K/UL


(4.8-10.8)


 


Red Blood Count


  5.03 M/UL


(4.70-6.10)


 


Hemoglobin


  14.4 G/DL


(14.2-18.0)


 


Hematocrit


  46.2 %


(42.0-52.0)


 


Mean Corpuscular Volume 92 FL (80-99)  


 


Mean Corpuscular Hemoglobin


  28.7 PG


(27.0-31.0)


 


Mean Corpuscular Hemoglobin


Concent 31.2 G/DL


(32.0-36.0)  L


 


Red Cell Distribution Width


  17.8 %


(11.6-14.8)  H


 


Platelet Count


  126 K/UL


(150-450)  L


 


Mean Platelet Volume


  7.2 FL


(6.5-10.1)


 


Neutrophils (%) (Auto)


  78.1 %


(45.0-75.0)  H


 


Lymphocytes (%) (Auto)


  14.4 %


(20.0-45.0)  L


 


Monocytes (%) (Auto)


  6.9 %


(1.0-10.0)


 


Eosinophils (%) (Auto)


  0.0 %


(0.0-3.0)


 


Basophils (%) (Auto)


  0.5 %


(0.0-2.0)


 


Sodium Level


  146 MMOL/L


(136-145)  H


 


Potassium Level


  4.2 MMOL/L


(3.5-5.1)


 


Chloride Level


  105 MMOL/L


()


 


Carbon Dioxide Level


  29 MMOL/L


(21-32)


 


Anion Gap


  12 mmol/L


(5-15)


 


Blood Urea Nitrogen


  89 mg/dL


(7-18)  H


 


Creatinine


  2.0 MG/DL


(0.55-1.30)  H


 


Estimat Glomerular Filtration


Rate 33.6 mL/min


(>60)


 


Glucose Level


  101 MG/DL


()


 


Uric Acid


  16.8 MG/DL


(2.6-7.2)  H


 


Calcium Level


  9.5 MG/DL


(8.5-10.1)


 


Phosphorus Level


  4.5 MG/DL


(2.5-4.9)


 


Magnesium Level


  2.1 MG/DL


(1.8-2.4)


 


Total Bilirubin


  1.5 MG/DL


(0.2-1.0)  H


 


Direct Bilirubin


  0.9 MG/DL


(0.0-0.3)  H


 


Aspartate Amino Transf


(AST/SGOT) 179 U/L


(15-37)  H


 


Alanine Aminotransferase


(ALT/SGPT) 214 U/L


(12-78)  H


 


Alkaline Phosphatase


  145 U/L


()  H


 


Pro-B-Type Natriuretic Peptide


  > 36406 pg/mL


(0-125)  H


 


Total Protein


  7.8 G/DL


(6.4-8.2)


 


Albumin


  2.9 G/DL


(3.4-5.0)  L


 


Globulin 4.9 g/dL  


 


Albumin/Globulin Ratio


  0.6 (1.0-2.7)


L

















Intake and Output  


 


 1/19/18 1/20/18





 19:00 07:00


 


Intake Total 550 ml 


 


Output Total  350 ml


 


Balance 550 ml -350 ml


 


  


 


Intake Oral 550 ml 


 


Output Urine Total  350 ml


 


# Voids  2


 


# Bowel Movements  1











Assessment/Plan


Problem List:  


(1) Hypercholesterolemia


Assessment & Plan:  Hold statin due to elevated Liver funct tests.





(2) Edema


(3) Elevated troponin


Assessment & Plan:  Troponin leak due to renal failure per cardiology-Dr Landaverde





(4) EF 20%


(5) Cardiomyopathy


Assessment & Plan:  New onset.  EF=20%.  See cardiology note.  Continue lasix 

and eliquis.





(6) Atrial flutter


Assessment & Plan:  Continue lopressor per cardiology-Dr Landaverde





(7) Diabetes mellitus


Assessment & Plan:  Continue novolog sliding scale.





(8) Acute on chronic renal failure


Assessment & Plan:  See nephrology note- Dr Gutiérrez.





Status:  not improved


Assessment/Plan


Discharge planning:  Transfer [facility] bed available.











KENDALL HUFF Jan 20, 2018 11:21

## 2018-01-20 NOTE — NEPHROLOGY PROGRESS NOTE
Assessment/Plan


Problem List:  


(1) Acute on chronic renal failure


(2) Cardiomyopathy


(3) Atrial flutter


(4) Pleural effusion


(5) Diabetes mellitus


Assessment





Urine positive for cocaine


Renal failure- Acute vs Chronic OR Acute superimposed on chronic


DM ? Nephropathy


Atrial Flutter-


Pleural effusion / Bilateral LE edema


Cardiomyopathy


Plan


Plan:








gentle diuresis


optimize cardiac status


2D Echo 20% EjFx


Kidney RICHARD WNL


flomax


gastric support


monitor renal parameters


Avoid Nephrotoxics


Hold Metformin


DC statins in view of high LFTs





Subjective


ROS Limited/Unobtainable:  No


Constitutional:  Reports: malaise





Objective


Objective





Last 24 Hour Vital Signs








  Date Time  Temp Pulse Resp B/P (MAP) Pulse Ox O2 Delivery O2 Flow Rate FiO2


 


1/20/18 08:37  80  133/95    


 


1/20/18 08:00  80      


 


1/20/18 08:00 97.0 80 19 133/95 95 Room Air  


 


1/20/18 04:00  79      


 


1/20/18 04:00 98.0 81 16 128/85 95   


 


1/20/18 00:00 97.0 78 18 125/91 90   


 


1/20/18 00:00  78      


 


1/19/18 22:24  81  128/94    


 


1/19/18 20:00 97.0 81 20 128/94 93   


 


1/19/18 20:00  80      


 


1/19/18 16:39  78      


 


1/19/18 16:00 97.3 79 20 133/90 95 Room Air  


 


1/19/18 13:03 97.3 80 20 110/78 95   

















Intake and Output  


 


 1/19/18 1/20/18





 19:00 07:00


 


Intake Total 550 ml 


 


Output Total  350 ml


 


Balance 550 ml -350 ml


 


  


 


Intake Oral 550 ml 


 


Output Urine Total  350 ml


 


# Voids  2


 


# Bowel Movements  1








Laboratory Tests


1/20/18 06:42: 


White Blood Count 7.1, Red Blood Count 5.03, Hemoglobin 14.4, Hematocrit 46.2, 

Mean Corpuscular Volume 92, Mean Corpuscular Hemoglobin 28.7, Mean Corpuscular 

Hemoglobin Concent 31.2L, Red Cell Distribution Width 17.8H, Platelet Count 126L

, Mean Platelet Volume 7.2, Neutrophils (%) (Auto) 78.1H, Lymphocytes (%) (Auto

) 14.4L, Monocytes (%) (Auto) 6.9, Eosinophils (%) (Auto) 0.0, Basophils (%) (

Auto) 0.5, Sodium Level 146H, Potassium Level 4.2, Chloride Level 105, Carbon 

Dioxide Level 29, Anion Gap 12, Blood Urea Nitrogen 89H, Creatinine 2.0H, 

Estimat Glomerular Filtration Rate 33.6, Glucose Level 101, Uric Acid 16.8H, 

Calcium Level 9.5, Phosphorus Level 4.5, Magnesium Level 2.1, Total Bilirubin 

1.5H, Direct Bilirubin 0.9H, Aspartate Amino Transf (AST/SGOT) 179H, Alanine 

Aminotransferase (ALT/SGPT) 214H, Alkaline Phosphatase 145H, Pro-B-Type 

Natriuretic Peptide > 89796H, Total Protein 7.8, Albumin 2.9L, Globulin 4.9, 

Albumin/Globulin Ratio 0.6L


Height (Feet):  5


Height (Inches):  11.00


Weight (Pounds):  174


General Appearance:  no apparent distress


Cardiovascular:  regular rhythm


Respiratory/Chest:  decreased breath sounds


Abdomen:  distended


Extremities:  other - 1+ edema


Objective


no change-











IVANA CHAIDEZ Jan 20, 2018 12:03

## 2018-01-21 VITALS — DIASTOLIC BLOOD PRESSURE: 77 MMHG | SYSTOLIC BLOOD PRESSURE: 142 MMHG

## 2018-01-21 VITALS — SYSTOLIC BLOOD PRESSURE: 122 MMHG | DIASTOLIC BLOOD PRESSURE: 73 MMHG

## 2018-01-21 VITALS — SYSTOLIC BLOOD PRESSURE: 128 MMHG | DIASTOLIC BLOOD PRESSURE: 72 MMHG

## 2018-01-21 VITALS — SYSTOLIC BLOOD PRESSURE: 112 MMHG | DIASTOLIC BLOOD PRESSURE: 72 MMHG

## 2018-01-21 VITALS — DIASTOLIC BLOOD PRESSURE: 78 MMHG | SYSTOLIC BLOOD PRESSURE: 124 MMHG

## 2018-01-21 VITALS — DIASTOLIC BLOOD PRESSURE: 82 MMHG | SYSTOLIC BLOOD PRESSURE: 132 MMHG

## 2018-01-21 LAB
ANION GAP SERPL CALC-SCNC: 12 MMOL/L (ref 5–15)
BUN SERPL-MCNC: 72 MG/DL (ref 7–18)
CALCIUM SERPL-MCNC: 9.1 MG/DL (ref 8.5–10.1)
CHLORIDE SERPL-SCNC: 104 MMOL/L (ref 98–107)
CO2 SERPL-SCNC: 33 MMOL/L (ref 21–32)
CREAT SERPL-MCNC: 1.8 MG/DL (ref 0.55–1.3)
POTASSIUM SERPL-SCNC: 3.5 MMOL/L (ref 3.5–5.1)
SODIUM SERPL-SCNC: 149 MMOL/L (ref 136–145)

## 2018-01-21 RX ADMIN — APIXABAN SCH MG: 2.5 TABLET, FILM COATED ORAL at 08:07

## 2018-01-21 RX ADMIN — INSULIN ASPART SCH UNITS: 100 INJECTION, SOLUTION INTRAVENOUS; SUBCUTANEOUS at 11:18

## 2018-01-21 RX ADMIN — ASPIRIN 81 MG SCH MG: 81 TABLET ORAL at 08:07

## 2018-01-21 RX ADMIN — INSULIN ASPART SCH UNITS: 100 INJECTION, SOLUTION INTRAVENOUS; SUBCUTANEOUS at 16:30

## 2018-01-21 RX ADMIN — TAMSULOSIN HYDROCHLORIDE SCH MG: 0.4 CAPSULE ORAL at 21:13

## 2018-01-21 RX ADMIN — DOCUSATE SODIUM SCH MG: 100 CAPSULE, LIQUID FILLED ORAL at 08:16

## 2018-01-21 RX ADMIN — APIXABAN SCH MG: 2.5 TABLET, FILM COATED ORAL at 17:15

## 2018-01-21 RX ADMIN — INSULIN ASPART SCH UNITS: 100 INJECTION, SOLUTION INTRAVENOUS; SUBCUTANEOUS at 06:30

## 2018-01-21 RX ADMIN — INSULIN ASPART SCH UNITS: 100 INJECTION, SOLUTION INTRAVENOUS; SUBCUTANEOUS at 21:28

## 2018-01-21 RX ADMIN — DOCUSATE SODIUM SCH MG: 100 CAPSULE, LIQUID FILLED ORAL at 17:16

## 2018-01-21 RX ADMIN — DOCUSATE SODIUM SCH MG: 100 CAPSULE, LIQUID FILLED ORAL at 12:34

## 2018-01-21 NOTE — CARDIAC ELECTROPHYSIOLOGY PN
Assessment/Plan


Assessment/Plan


1. Troponin leak.  Due to renal failure and congestive heart failure.  His 

creatinine was 2.4.   Continue on Lipitor, metoprolol,


and aspirin. Likely need cardiac catheterization in view of also severely newly 

diagnosed cardiomyopathy if renal fx improves.





2. Newly diagnosed cardiomyopathy, ejection fraction of only 20%.  BNP > 55987. 

On Lasix 80 mg IV b.i.d. and Lopressor


     Hold off on lisinopril and Aldactone in view of renal failure and 

hyperkalemia. BNP better now.





3. Atrial flutter with rapid ventricular response.HR better on metoprolol 100 

mg b.i.d. and Eliquis 2.5 bid. 


     Likely benefits from KAITLIN and atrial flutter ablation. 





4. History of cocaine use.  Urine Tox screen still positive





5. Renal failure.  Cr 2.4 now 1.8. F/U by Dr. Gutiérrez.


6. Diabetes.


7. Hyperlipidemia.





BONILLA RN





Subjective


Subjective


In flutter with controlled rate. Yesterday had 21 beats of NSVT.





Objective





Last 24 Hour Vital Signs








  Date Time  Temp Pulse Resp B/P (MAP) Pulse Ox O2 Delivery O2 Flow Rate FiO2


 


1/21/18 12:00 98.1 81 20 112/72 94 Room Air  


 


1/21/18 08:16  90  124/78    


 


1/21/18 08:00 96.7 90 18 124/78 100 Nasal Cannula 2.0 


 


1/21/18 08:00  71      


 


1/21/18 04:00 97.0 94 18 142/77 100   


 


1/21/18 04:00  70      


 


1/21/18 00:00  69      


 


1/21/18 00:00 97.0 77 16 122/73 100   


 


1/20/18 20:59  84  139/84    


 


1/20/18 20:00  76      


 


1/20/18 20:00 97.0 84 16 139/84 95   


 


1/20/18 16:00 97.3 66 19 134/68 97 Room Air  





        


 


1/20/18 16:00  73      

















Intake and Output  


 


 1/20/18 1/21/18





 19:00 07:00


 


Intake Total 800 ml 


 


Output Total 1100 ml 2025 ml


 


Balance -300 ml -2025 ml


 


  


 


Intake Oral 800 ml 


 


Output Urine Total 1100 ml 2025 ml


 


# Voids 1 5











Laboratory Tests








Test


  1/21/18


06:31


 


Sodium Level


  149 MMOL/L


(136-145)  H


 


Potassium Level


  3.5 MMOL/L


(3.5-5.1)


 


Chloride Level


  104 MMOL/L


()


 


Carbon Dioxide Level


  33 MMOL/L


(21-32)  H


 


Anion Gap


  12 mmol/L


(5-15)


 


Blood Urea Nitrogen


  72 mg/dL


(7-18)  H


 


Creatinine


  1.8 MG/DL


(0.55-1.30)  H


 


Estimat Glomerular Filtration


Rate 37.9 mL/min


(>60)


 


Glucose Level


  111 MG/DL


()  H


 


Calcium Level


  9.1 MG/DL


(8.5-10.1)


 


Troponin I


  0.451 ng/mL


(0.000-0.056)


 


Pro-B-Type Natriuretic Peptide


  45267 pg/mL


(0-125)  H


 


Free Thyroxine


  1.33 NG/DL


(0.76-1.46)








Objective


HEAD AND NECK:  Positive jugular venous distention.


LUNGS:  Decreased breath sounds.


CARDIOVASCULAR:  Irregularly irregular S1 and S2 with no gallop or murmur.


ABDOMEN:  Soft.


EXTREMITIES:  There is 3+ pitting edema.











ANNA IBRAHIM Jan 21, 2018 13:31

## 2018-01-21 NOTE — NEPHROLOGY PROGRESS NOTE
Assessment/Plan


Problem List:  


(1) Acute on chronic renal failure


(2) Cardiomyopathy


(3) Atrial flutter


(4) Pleural effusion


(5) Diabetes mellitus


Assessment





Urine positive for cocaine





Renal failure- Acute vs Chronic OR Acute superimposed on chronic Cr down 1.8


DM ? Nephropathy


Atrial Flutter-


Pleural effusion / Bilateral LE edema


Cardiomyopathy


Plan





Plan:


gentle diuresis


optimize cardiac status


2D Echo 20% EjFx


Kidney RICHARD WNL


flomax


gastric support


monitor renal parameters


Avoid Nephrotoxics


Hold Metformin


DC statins in view of high LFTs





Subjective


ROS Limited/Unobtainable:  No


Constitutional:  Reports: malaise





Objective


Objective





Last 24 Hour Vital Signs








  Date Time  Temp Pulse Resp B/P (MAP) Pulse Ox O2 Delivery O2 Flow Rate FiO2


 


1/21/18 08:16  90  124/78    


 


1/21/18 08:00 96.7 90 18 124/78 100 Nasal Cannula 2.0 


 


1/21/18 08:00  71      


 


1/21/18 04:00 97.0 94 18 142/77 100   


 


1/21/18 04:00  70      


 


1/21/18 00:00  69      


 


1/21/18 00:00 97.0 77 16 122/73 100   


 


1/20/18 20:59  84  139/84    


 


1/20/18 20:00  76      


 


1/20/18 20:00 97.0 84 16 139/84 95   


 


1/20/18 16:00 97.3 66 19 134/68 97 Room Air  





        


 


1/20/18 16:00  73      


 


1/20/18 12:00 97.0 77 21 128/68 96 Room Air  





        


 


1/20/18 12:00  65      

















Intake and Output  


 


 1/20/18 1/21/18





 19:00 07:00


 


Intake Total 800 ml 


 


Output Total 1100 ml 2025 ml


 


Balance -300 ml -2025 ml


 


  


 


Intake Oral 800 ml 


 


Output Urine Total 1100 ml 2025 ml


 


# Voids 1 5








Laboratory Tests


1/20/18 12:41: 


White Blood Count 8.1, Red Blood Count 5.26, Hemoglobin 14.7, Hematocrit 47.8, 

Mean Corpuscular Volume 91, Mean Corpuscular Hemoglobin 28.0, Mean Corpuscular 

Hemoglobin Concent 30.8L, Red Cell Distribution Width 17.5H, Platelet Count 144L

, Mean Platelet Volume 9.3, Neutrophils (%) (Auto) 79.2H, Lymphocytes (%) (Auto

) 14.2L, Monocytes (%) (Auto) 6.2, Eosinophils (%) (Auto) 0.0, Basophils (%) (

Auto) 0.4


1/21/18 06:31: 


Sodium Level 149H, Potassium Level 3.5, Chloride Level 104, Carbon Dioxide 

Level 33H, Anion Gap 12, Blood Urea Nitrogen 72H, Creatinine 1.8H, Estimat 

Glomerular Filtration Rate 37.9, Glucose Level 111H, Calcium Level 9.1, 

Troponin I 0.451H, Pro-B-Type Natriuretic Peptide 14697T, Free Thyroxine 1.33


Height (Feet):  5


Height (Inches):  11.00


Weight (Pounds):  160


General Appearance:  no apparent distress


Objective


no change-











IVANA CHAIDEZ Jan 21, 2018 10:57

## 2018-01-21 NOTE — DIAGNOSTIC IMAGING REPORT
--------------- APPROVED REPORT --------------





CPT Code: 81681





RIGHT LEG: Venous imaging reveals recanalized chronic thrombus in the superficial femoral 

 vein.  Imaging also reveals patency of the common femoral, popliteal and calf veins. The 

greater saphenous vein is also within normal limits. Doppler indicates normal spontaneous 

flow within these segments.

LEFT LEG: Venous imaging reveals a patent deep venous system. There is no evidence of 

thrombus within the femoral, popliteal or tibial segments. The greater saphenous vein is 

also within normal limits. Doppler indicates normal spontaneous flow within these 

segments.

## 2018-01-21 NOTE — INTERNAL MED PROGRESS NOTE
Subjective


Date of Service:  Jan 21, 2018


Physician Name


Kendall Huff


Attending Physician


Dejon Mcclendon MD





Current Medications








 Medications


  (Trade)  Dose


 Ordered  Sig/Alice


 Route


 PRN Reason  Start Time


 Stop Time Status Last Admin


Dose Admin


 


 Acetaminophen


  (Tylenol)  650 mg  Q6H  PRN


 ORAL


 Mild Pain/Temp > 100.5  1/18/18 12:00


 2/17/18 11:59  1/18/18 12:52


 


 


 Allopurinol


  (Allopurinol)  300 mg  DAILY


 ORAL


   1/19/18 09:00


 2/18/18 08:59  1/21/18 08:07


 


 


 Apixaban


  (Eliquis)  2.5 mg  BID


 ORAL


   1/20/18 09:00


 2/19/18 08:59  1/21/18 08:07


 


 


 Aspirin


  (ASA)  81 mg  DAILY


 ORAL


   1/18/18 12:30


 2/17/18 12:29  1/21/18 08:07


 


 


 Dextrose


  (Dextrose 50%)    STAT  PRN


 IV


 Hypoglycemia  1/18/18 12:00


 2/17/18 11:59  1/19/18 16:18


 


 


 Docusate Sodium


  (Colace)  100 mg  THREE TIMES A  DAY


 ORAL


   1/19/18 18:00


 2/18/18 17:59  1/19/18 17:04


 


 


 Furosemide


  (Lasix)  80 mg  EVERY 12  HOURS


 IV


   1/18/18 21:00


 2/17/18 20:59  1/21/18 08:17


 


 


 Insulin Aspart


  (NovoLOG)    BEFORE MEALS AND  HS


 SUBQ


   1/18/18 16:30


 2/17/18 16:29  1/20/18 13:10


 


 


 Lansoprazole


  (Prevacid)  30 mg  DAILY


 ORAL


   1/18/18 17:15


 2/17/18 17:14  1/21/18 08:07


 


 


 Metoprolol


 Tartrate


  (Lopressor)  100 mg  Q12HR


 ORAL


   1/19/18 21:00


 2/18/18 20:59  1/21/18 08:16


 


 


 Ondansetron HCl


  (Zofran)  4 mg  Q4H  PRN


 IVP


 Nausea & Vomiting  1/18/18 12:00


 2/17/18 11:59   


 


 


 Sevelamer


 Carbonate


  (Renvela)  800 mg  THREE TIMES A  DAY


 ORAL


   1/19/18 18:00


 2/18/18 17:59  1/21/18 12:34


 


 


 Tamsulosin HCl


  (Flomax)  0.4 mg  BEDTIME


 ORAL


   1/18/18 21:00


 2/17/18 20:59  1/20/18 20:59


 








Allergies:  


Coded Allergies:  


     NO KNOWN ALLERGIES (Verified  Allergy, Unknown, 1/18/18)


ROS Limited/Unobtainable:  No


Constitutional:  Reports: no symptoms


HEENT:  Reports: no symptoms


Cardiovascular:  Reports: no symptoms


Respiratory:  Reports: shortness of breath


Gastrointestinal/Abdominal:  Reports: no symptoms


Genitourinary:  Reports: no symptoms


Neurologic/Psychiatric:  Reports: no symptoms


Subjective


68 YO M admitted with shortness of breath.  Now new cardiomyopathy, CHF and 

renal failure.  Await transfer to [facility] for cardiac cath.  Cover for 

[facility]-Dr Mcclendon.





Objective





Last Vital Signs








  Date Time  Temp Pulse Resp B/P (MAP) Pulse Ox O2 Delivery O2 Flow Rate FiO2


 


1/21/18 12:00 98.1 81 20 112/72 94 Room Air  


 


1/21/18 08:00       2.0 











Laboratory Tests








Test


  1/21/18


06:31


 


Sodium Level


  149 MMOL/L


(136-145)  H


 


Potassium Level


  3.5 MMOL/L


(3.5-5.1)


 


Chloride Level


  104 MMOL/L


()


 


Carbon Dioxide Level


  33 MMOL/L


(21-32)  H


 


Anion Gap


  12 mmol/L


(5-15)


 


Blood Urea Nitrogen


  72 mg/dL


(7-18)  H


 


Creatinine


  1.8 MG/DL


(0.55-1.30)  H


 


Estimat Glomerular Filtration


Rate 37.9 mL/min


(>60)


 


Glucose Level


  111 MG/DL


()  H


 


Calcium Level


  9.1 MG/DL


(8.5-10.1)


 


Troponin I


  0.451 ng/mL


(0.000-0.056)


 


Pro-B-Type Natriuretic Peptide


  04311 pg/mL


(0-125)  H


 


Free Thyroxine


  1.33 NG/DL


(0.76-1.46)

















Intake and Output  


 


 1/20/18 1/21/18





 19:00 07:00


 


Intake Total 800 ml 


 


Output Total 1100 ml 2025 ml


 


Balance -300 ml -2025 ml


 


  


 


Intake Oral 800 ml 


 


Output Urine Total 1100 ml 2025 ml


 


# Voids 1 5








Objective


General Appearance:  WD/WN, no apparent distress, alert


EENT:  PERRL/EOMI, normal ENT inspection


Neck:  non-tender, normal alignment, supple, normal inspection


Cardiovascular:  normal peripheral pulses, no gallop/murmur, no JVD, 

irregularly irregular


Respiratory/Chest:  chest wall non-tender, no accessory muscle use, respiratory 

distress, crackles/rales, rhonchi - bilaterally, expiratory wheezing


Abdomen:  normal bowel sounds, non tender, soft, no organomegaly, no mass


Extremities:  normal range of motion


Edema:  mild edema


Neurologic:  CNs II-XII grossly normal, no motor/sensory deficits


Skin:  normal pigmentation, warm/dry





Assessment/Plan


Problem List:  


(1) Hypercholesterolemia


Assessment & Plan:  Hold statin due to elevated Liver funct tests.





(2) Edema


(3) Elevated troponin


Assessment & Plan:  Troponin leak due to renal failure per cardiology-Dr Landaverde





(4) EF 20%


(5) Cardiomyopathy


Assessment & Plan:  New onset.  EF=20%.  See cardiology note.  Continue lasix 

and eliquis.





(6) Atrial flutter


Assessment & Plan:  Continue lopressor per cardiology-Dr Landaverde





(7) Diabetes mellitus


Assessment & Plan:  Continue novolog sliding scale.





(8) Acute on chronic renal failure


Assessment & Plan:  See nephrology note- Dr Gutiérrez.





Assessment/Plan


Discharge planning:  Transfer to [facility] when bed available for cardiac cath.











KENDALL HUFF Jan 21, 2018 14:41

## 2018-01-21 NOTE — PULMONOLOGY PROGRESS NOTE
Assessment/Plan


Assessment/Plan


ASSESSMENT


Atrial flutter with RVR


Elevated troponin troponin leak ( due to renal failure)


severe cardiomyopathy ( EF 20-25%)


systolic and diastolic heart failure 


acute on chronic renal failure 


cocaine abuse 


moderate MR and TR 


elevated TSH 


transaminitis 





PLAN OF CARE


Tele 


cardio follows 


IV diuresis monitor cardiorenal parameters, volumes


Medical management of CHF with Lasix, BB, 


hold ACE 2 to renal failure


Per cardio patient will need heart cath due to newly diagnosed CM ( [facility]) 


Placed on statin, ASA, BB


Rate control with BB, a/coagulation with Eliquis


ECHO with EF 20-25% and RVSP of 22, moderate MR and TR


Nephro follows acute vs chronic vs acute superimposed on chronic renal failure


Renal US essentially negative 


creat trending down  


monitor renal parameters, lytes, correct as needed, avoid nephrotoxic


Flomax started 


GI prophylaxis


due to severely elevated LFT statin was dc 1/19/18 


Metformin on hold  2 to renal failure


BS management with SS of insulin 


urine tox screen +cocaine 


 on abstinence from street drug 


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 


discussed with the patient, 


patient was not aware prior to this admission that he had weak heart   


free T4 WL with elevated TSH, 


recommend recheck FTP in 2 months


prob can be dc on meds for management of systolic HF and A fluter if cleared by 

cardio with outpt fup at [facility] for further management 





case discussed and evaluated by supervising physician





Subjective


Allergies:  


Coded Allergies:  


     NO KNOWN ALLERGIES (Verified  Allergy, Unknown, 1/18/18)


Subjective


denies chest pain, SOB  


remains in A flutter, rate controlled





Objective





Last 24 Hour Vital Signs








  Date Time  Temp Pulse Resp B/P (MAP) Pulse Ox O2 Delivery O2 Flow Rate FiO2


 


1/21/18 12:00 98.1 81 20 112/72 94 Room Air  


 


1/21/18 08:16  90  124/78    


 


1/21/18 08:00 96.7 90 18 124/78 100 Nasal Cannula 2.0 


 


1/21/18 08:00  71      


 


1/21/18 04:00 97.0 94 18 142/77 100   


 


1/21/18 04:00  70      


 


1/21/18 00:00  69      


 


1/21/18 00:00 97.0 77 16 122/73 100   


 


1/20/18 20:59  84  139/84    


 


1/20/18 20:00  76      


 


1/20/18 20:00 97.0 84 16 139/84 95   


 


1/20/18 16:00 97.3 66 19 134/68 97 Room Air  





        


 


1/20/18 16:00  73      

















Intake and Output  


 


 1/20/18 1/21/18





 19:00 07:00


 


Intake Total 800 ml 


 


Output Total 1100 ml 2025 ml


 


Balance -300 ml -2025 ml


 


  


 


Intake Oral 800 ml 


 


Output Urine Total 1100 ml 2025 ml


 


# Voids 1 5








Objective


General Appearance:  no acute distress


HEENT:  normocephalic, atraumatic, anicteric, mucous membranes moist


Respiratory/Chest:  lungs clear, no respiratory distress, no accessory muscle 

use


Cardiovascular:  normal rate - A flutter on tle


Abdomen:  normal bowel sounds, soft, non tender, non distended


Extremities:  no edema, pedal pulses normal


Neurologic/Psychiatric:  no motor/sensory deficits, alert, oriented x 3, 

responsive


Musculoskeletal:  normal muscle bulk


Laboratory Tests


1/21/18 06:31: 


Sodium Level 149H, Potassium Level 3.5, Chloride Level 104, Carbon Dioxide 

Level 33H, Anion Gap 12, Blood Urea Nitrogen 72H, Creatinine 1.8H, Estimat 

Glomerular Filtration Rate 37.9, Glucose Level 111H, Calcium Level 9.1, 

Troponin I 0.451H, Pro-B-Type Natriuretic Peptide 82385L, Free Thyroxine 1.33





Current Medications








 Medications


  (Trade)  Dose


 Ordered  Sig/Alice


 Route


 PRN Reason  Start Time


 Stop Time Status Last Admin


Dose Admin


 


 Acetaminophen


  (Tylenol)  650 mg  Q6H  PRN


 ORAL


 Mild Pain/Temp > 100.5  1/18/18 12:00


 2/17/18 11:59  1/18/18 12:52


 


 


 Allopurinol


  (Allopurinol)  300 mg  DAILY


 ORAL


   1/19/18 09:00


 2/18/18 08:59  1/21/18 08:07


 


 


 Apixaban


  (Eliquis)  2.5 mg  BID


 ORAL


   1/20/18 09:00


 2/19/18 08:59  1/21/18 08:07


 


 


 Aspirin


  (ASA)  81 mg  DAILY


 ORAL


   1/18/18 12:30


 2/17/18 12:29  1/21/18 08:07


 


 


 Dextrose


  (Dextrose 50%)    STAT  PRN


 IV


 Hypoglycemia  1/18/18 12:00


 2/17/18 11:59  1/19/18 16:18


 


 


 Docusate Sodium


  (Colace)  100 mg  THREE TIMES A  DAY


 ORAL


   1/19/18 18:00


 2/18/18 17:59  1/19/18 17:04


 


 


 Furosemide


  (Lasix)  80 mg  EVERY 12  HOURS


 IV


   1/18/18 21:00


 2/17/18 20:59  1/21/18 08:17


 


 


 Insulin Aspart


  (NovoLOG)    BEFORE MEALS AND  HS


 SUBQ


   1/18/18 16:30


 2/17/18 16:29  1/20/18 13:10


 


 


 Lansoprazole


  (Prevacid)  30 mg  DAILY


 ORAL


   1/18/18 17:15


 2/17/18 17:14  1/21/18 08:07


 


 


 Metoprolol


 Tartrate


  (Lopressor)  100 mg  Q12HR


 ORAL


   1/19/18 21:00


 2/18/18 20:59  1/21/18 08:16


 


 


 Ondansetron HCl


  (Zofran)  4 mg  Q4H  PRN


 IVP


 Nausea & Vomiting  1/18/18 12:00


 2/17/18 11:59   


 


 


 Sevelamer


 Carbonate


  (Renvela)  800 mg  THREE TIMES A  DAY


 ORAL


   1/19/18 18:00


 2/18/18 17:59  1/21/18 12:34


 


 


 Tamsulosin HCl


  (Flomax)  0.4 mg  BEDTIME


 ORAL


   1/18/18 21:00


 2/17/18 20:59  1/20/18 20:59


 

















Simon ([facility])Kaitlynn NP Jan 21, 2018 13:53

## 2018-01-22 VITALS — SYSTOLIC BLOOD PRESSURE: 100 MMHG | DIASTOLIC BLOOD PRESSURE: 61 MMHG

## 2018-01-22 VITALS — SYSTOLIC BLOOD PRESSURE: 100 MMHG | DIASTOLIC BLOOD PRESSURE: 56 MMHG

## 2018-01-22 VITALS — DIASTOLIC BLOOD PRESSURE: 75 MMHG | SYSTOLIC BLOOD PRESSURE: 116 MMHG

## 2018-01-22 VITALS — DIASTOLIC BLOOD PRESSURE: 71 MMHG | SYSTOLIC BLOOD PRESSURE: 119 MMHG

## 2018-01-22 VITALS — DIASTOLIC BLOOD PRESSURE: 75 MMHG | SYSTOLIC BLOOD PRESSURE: 104 MMHG

## 2018-01-22 VITALS — DIASTOLIC BLOOD PRESSURE: 65 MMHG | SYSTOLIC BLOOD PRESSURE: 113 MMHG

## 2018-01-22 LAB
ADD MANUAL DIFF: NO
ALBUMIN SERPL-MCNC: 2.4 G/DL (ref 3.4–5)
ALP SERPL-CCNC: 170 U/L (ref 46–116)
ALT SERPL-CCNC: 183 U/L (ref 12–78)
ANION GAP SERPL CALC-SCNC: 8 MMOL/L (ref 5–15)
AST SERPL-CCNC: 128 U/L (ref 15–37)
BASOPHILS NFR BLD AUTO: 0.2 % (ref 0–2)
BILIRUB DIRECT SERPL-MCNC: 0.8 MG/DL (ref 0–0.3)
BILIRUB SERPL-MCNC: 1.5 MG/DL (ref 0.2–1)
BUN SERPL-MCNC: 54 MG/DL (ref 7–18)
CALCIUM SERPL-MCNC: 8.5 MG/DL (ref 8.5–10.1)
CHLORIDE SERPL-SCNC: 104 MMOL/L (ref 98–107)
CO2 SERPL-SCNC: 36 MMOL/L (ref 21–32)
CREAT SERPL-MCNC: 1.4 MG/DL (ref 0.55–1.3)
EOSINOPHIL NFR BLD AUTO: 0 % (ref 0–3)
ERYTHROCYTE [DISTWIDTH] IN BLOOD BY AUTOMATED COUNT: 17.7 % (ref 11.6–14.8)
HCT VFR BLD CALC: 43.2 % (ref 42–52)
HGB BLD-MCNC: 13.3 G/DL (ref 14.2–18)
LYMPHOCYTES NFR BLD AUTO: 7.7 % (ref 20–45)
MCV RBC AUTO: 92 FL (ref 80–99)
MONOCYTES NFR BLD AUTO: 7.2 % (ref 1–10)
NEUTROPHILS NFR BLD AUTO: 84.9 % (ref 45–75)
PHOSPHATE SERPL-MCNC: 3.1 MG/DL (ref 2.5–4.9)
PLATELET # BLD: 141 K/UL (ref 150–450)
POTASSIUM SERPL-SCNC: 2.5 MMOL/L (ref 3.5–5.1)
RBC # BLD AUTO: 4.71 M/UL (ref 4.7–6.1)
SODIUM SERPL-SCNC: 147 MMOL/L (ref 136–145)
WBC # BLD AUTO: 9.9 K/UL (ref 4.8–10.8)

## 2018-01-22 RX ADMIN — APIXABAN SCH MG: 2.5 TABLET, FILM COATED ORAL at 09:05

## 2018-01-22 RX ADMIN — INSULIN ASPART SCH UNITS: 100 INJECTION, SOLUTION INTRAVENOUS; SUBCUTANEOUS at 11:47

## 2018-01-22 RX ADMIN — DOCUSATE SODIUM SCH MG: 100 CAPSULE, LIQUID FILLED ORAL at 18:18

## 2018-01-22 RX ADMIN — TAMSULOSIN HYDROCHLORIDE SCH MG: 0.4 CAPSULE ORAL at 20:52

## 2018-01-22 RX ADMIN — DOCUSATE SODIUM SCH MG: 100 CAPSULE, LIQUID FILLED ORAL at 12:43

## 2018-01-22 RX ADMIN — INSULIN ASPART SCH UNITS: 100 INJECTION, SOLUTION INTRAVENOUS; SUBCUTANEOUS at 06:30

## 2018-01-22 RX ADMIN — DOCUSATE SODIUM SCH MG: 100 CAPSULE, LIQUID FILLED ORAL at 09:04

## 2018-01-22 RX ADMIN — APIXABAN SCH MG: 2.5 TABLET, FILM COATED ORAL at 18:18

## 2018-01-22 RX ADMIN — ASPIRIN 81 MG SCH MG: 81 TABLET ORAL at 09:05

## 2018-01-22 RX ADMIN — INSULIN ASPART SCH UNITS: 100 INJECTION, SOLUTION INTRAVENOUS; SUBCUTANEOUS at 16:30

## 2018-01-22 RX ADMIN — INSULIN ASPART SCH UNITS: 100 INJECTION, SOLUTION INTRAVENOUS; SUBCUTANEOUS at 20:54

## 2018-01-22 NOTE — PULMONOLOGY PROGRESS NOTE
Assessment/Plan


Problems:  


(1) Pleural effusion


(2) Atrial flutter


(3) Diabetes mellitus


(4) EF 20%


Assessment/Plan


renal function improving


still has Vtach


optimize cardiac meds


check electrolytes


f/u cardio recommendations.





Subjective


ROS Limited/Unobtainable:  No


Constitutional:  Reports: no symptoms


HEENT:  Repors: no symptoms


Respiratory:  Reports: no symptoms


Allergies:  


Coded Allergies:  


     NO KNOWN ALLERGIES (Verified  Allergy, Unknown, 1/18/18)





Objective





Last 24 Hour Vital Signs








  Date Time  Temp Pulse Resp B/P (MAP) Pulse Ox O2 Delivery O2 Flow Rate FiO2


 


1/22/18 12:00  76      


 


1/22/18 09:05  80  119/71    


 


1/22/18 08:00  81      


 


1/22/18 08:00 97.0 80 20 119/71 98 Room Air  


 


1/22/18 04:13 96.6 70 18 116/75 98 Room Air  


 


1/22/18 04:00  74      


 


1/22/18 00:14 97.5 71 18 100/61 97 Nasal Cannula  


 


1/22/18 00:00  76      


 


1/21/18 21:13  82  132/82    


 


1/21/18 20:00  80      


 


1/21/18 20:00 97.9 73 20 132/82 100 Nasal Cannula 2.0 


 


1/21/18 16:00  83      


 


1/21/18 16:00 97.4 78 20 128/72 94 Room Air  

















Intake and Output  


 


 1/21/18 1/22/18





 19:00 07:00


 


Intake Total 750 ml 


 


Output Total 1250 ml 400 ml


 


Balance -500 ml -400 ml


 


  


 


Intake Oral 750 ml 


 


Output Urine Total 1250 ml 400 ml


 


# Voids 1 








General Appearance:  WD/WN


HEENT:  normocephalic, atraumatic


Respiratory/Chest:  chest wall non-tender, lungs clear


Cardiovascular:  normal peripheral pulses, normal rate


Abdomen:  soft, non tender


Extremities:  no cyanosis


Skin:  no rash


Laboratory Tests


1/22/18 06:00: 


White Blood Count 9.9, Red Blood Count 4.71, Hemoglobin 13.3L, Hematocrit 43.2, 

Mean Corpuscular Volume 92, Mean Corpuscular Hemoglobin 28.2, Mean Corpuscular 

Hemoglobin Concent 30.7L, Red Cell Distribution Width 17.7H, Platelet Count 141L

, Mean Platelet Volume 9.7, Neutrophils (%) (Auto) 84.9H, Lymphocytes (%) (Auto

) 7.7L, Monocytes (%) (Auto) 7.2, Eosinophils (%) (Auto) 0.0, Basophils (%) (

Auto) 0.2, Sodium Level 147H, Potassium Level 2.5*L, Chloride Level 104, Carbon 

Dioxide Level 36H, Anion Gap 8, Blood Urea Nitrogen 54H, Creatinine 1.4H, 

Estimat Glomerular Filtration Rate 50.7, Glucose Level 107H, Calcium Level 8.5, 

Phosphorus Level 3.1, Magnesium Level 1.6L, Total Bilirubin 1.5H, Direct 

Bilirubin 0.8H, Aspartate Amino Transf (AST/SGOT) 128H, Alanine 

Aminotransferase (ALT/SGPT) 183H, Alkaline Phosphatase 170H, Pro-B-Type 

Natriuretic Peptide 75954W, Total Protein 7.0, Albumin 2.4L





Current Medications








 Medications


  (Trade)  Dose


 Ordered  Sig/Alice


 Route


 PRN Reason  Start Time


 Stop Time Status Last Admin


Dose Admin


 


 Acetaminophen


  (Tylenol)  650 mg  Q6H  PRN


 ORAL


 Mild Pain/Temp > 100.5  1/18/18 12:00


 2/17/18 11:59  1/22/18 14:08


 


 


 Allopurinol


  (Allopurinol)  300 mg  DAILY


 ORAL


   1/19/18 09:00


 2/18/18 08:59  1/22/18 09:05


 


 


 Apixaban


  (Eliquis)  5 mg  BID


 ORAL


   1/22/18 18:00


 2/21/18 17:59   


 


 


 Aspirin


  (ASA)  81 mg  DAILY


 ORAL


   1/18/18 12:30


 2/17/18 12:29  1/22/18 09:05


 


 


 Dextrose


  (Dextrose 50%)    STAT  PRN


 IV


 Hypoglycemia  1/18/18 12:00


 2/17/18 11:59  1/19/18 16:18


 


 


 Docusate Sodium


  (Colace)  100 mg  THREE TIMES A  DAY


 ORAL


   1/19/18 18:00


 2/18/18 17:59  1/22/18 12:43


 


 


 Furosemide


  (Lasix)  60 mg  EVERY 12  HOURS


 IV


   1/22/18 21:00


 2/21/18 20:59   


 


 


 Insulin Aspart


  (NovoLOG)    BEFORE MEALS AND  HS


 SUBQ


   1/18/18 16:30


 2/17/18 16:29  1/22/18 11:47


 


 


 Lansoprazole


  (Prevacid)  30 mg  DAILY


 ORAL


   1/18/18 17:15


 2/17/18 17:14  1/22/18 09:04


 


 


 Metoprolol


 Tartrate


  (Lopressor)  100 mg  Q12HR


 ORAL


   1/19/18 21:00


 2/18/18 20:59  1/22/18 09:05


 


 


 Ondansetron HCl


  (Zofran)  4 mg  Q4H  PRN


 IVP


 Nausea & Vomiting  1/18/18 12:00


 2/17/18 11:59   


 


 


 Potassium


 Chloride 50 meq/


 Sodium Chloride  575 ml @ 


 115 mls/hr  ONCE  ONCE


 IVPB


   1/22/18 11:15


 1/22/18 16:14  1/22/18 11:26


 


 


 Sevelamer


 Carbonate


  (Renvela)  800 mg  THREE TIMES A  DAY


 ORAL


   1/19/18 18:00


 2/18/18 17:59  1/22/18 12:43


 


 


 Tamsulosin HCl


  (Flomax)  0.4 mg  BEDTIME


 ORAL


   1/18/18 21:00


 2/17/18 20:59  1/21/18 21:13


 

















JAVID CHAVARRIA Jan 22, 2018 14:38

## 2018-01-22 NOTE — CARDIAC ELECTROPHYSIOLOGY PN
Assessment/Plan


Assessment/Plan


1. Troponin leak.  Due to renal failure and congestive heart failure.  His 

creatinine was 2.4.   Continue on Lipitor, metoprolol,


and aspirin. Cardiac catheterization in view of also severely newly diagnosed 

cardiomyopathy and long runs of VT after renal fx improves.





2. Newly diagnosed cardiomyopathy, ejection fraction of only 20%.  BNP > 49177. 

Dectrease Lasix to 60 mg IV b.i.d. and Lopressor


     Hold off on lisinopril and Aldactone in view of renal failure and 

hyperkalemia.  





3. Atrial flutter with rapid ventricular response.HR better on metoprolol 100 

mg b.i.d. and Eliquis 2.5 bid. 


    Benefits from KAITLIN and atrial flutter ablation afetr cardiac cath. 





4. History of cocaine use.  Urine Tox screen still positive





5. Renal failure.  Cr 2.4 now 1.4. F/U by Dr. Gutiérrez.


6. Diabetes.


7. Hyperlipidemia.





BONILLA RN





Subjective


Subjective


In flutter with controlled rate. Had 21 beats of NSVT 2 days ago. No VT 

overnight





Objective





Last 24 Hour Vital Signs








  Date Time  Temp Pulse Resp B/P (MAP) Pulse Ox O2 Delivery O2 Flow Rate FiO2


 


1/22/18 09:05  80  119/71    


 


1/22/18 08:00 97.0 80 20 119/71 98 Room Air  


 


1/22/18 04:13 96.6 70 18 116/75 98 Room Air  


 


1/22/18 04:00  74      


 


1/22/18 00:14 97.5 71 18 100/61 97 Nasal Cannula  


 


1/22/18 00:00  76      


 


1/21/18 21:13  82  132/82    


 


1/21/18 20:00  80      


 


1/21/18 20:00 97.9 73 20 132/82 100 Nasal Cannula 2.0 


 


1/21/18 16:00  83      


 


1/21/18 16:00 97.4 78 20 128/72 94 Room Air  

















Intake and Output  


 


 1/21/18 1/22/18





 19:00 07:00


 


Intake Total 750 ml 


 


Output Total 1250 ml 400 ml


 


Balance -500 ml -400 ml


 


  


 


Intake Oral 750 ml 


 


Output Urine Total 1250 ml 400 ml


 


# Voids 1 











Laboratory Tests








Test


  1/22/18


06:00


 


White Blood Count


  9.9 K/UL


(4.8-10.8)


 


Red Blood Count


  4.71 M/UL


(4.70-6.10)


 


Hemoglobin


  13.3 G/DL


(14.2-18.0)  L


 


Hematocrit


  43.2 %


(42.0-52.0)


 


Mean Corpuscular Volume 92 FL (80-99)  


 


Mean Corpuscular Hemoglobin


  28.2 PG


(27.0-31.0)


 


Mean Corpuscular Hemoglobin


Concent 30.7 G/DL


(32.0-36.0)  L


 


Red Cell Distribution Width


  17.7 %


(11.6-14.8)  H


 


Platelet Count


  141 K/UL


(150-450)  L


 


Mean Platelet Volume


  9.7 FL


(6.5-10.1)


 


Neutrophils (%) (Auto)


  84.9 %


(45.0-75.0)  H


 


Lymphocytes (%) (Auto)


  7.7 %


(20.0-45.0)  L


 


Monocytes (%) (Auto)


  7.2 %


(1.0-10.0)


 


Eosinophils (%) (Auto)


  0.0 %


(0.0-3.0)


 


Basophils (%) (Auto)


  0.2 %


(0.0-2.0)


 


Sodium Level


  147 MMOL/L


(136-145)  H


 


Potassium Level


  2.5 MMOL/L


(3.5-5.1)  *L


 


Chloride Level


  104 MMOL/L


()


 


Carbon Dioxide Level


  36 MMOL/L


(21-32)  H


 


Anion Gap


  8 mmol/L


(5-15)


 


Blood Urea Nitrogen


  54 mg/dL


(7-18)  H


 


Creatinine


  1.4 MG/DL


(0.55-1.30)  H


 


Estimat Glomerular Filtration


Rate 50.7 mL/min


(>60)


 


Glucose Level


  107 MG/DL


()  H


 


Calcium Level


  8.5 MG/DL


(8.5-10.1)


 


Phosphorus Level


  3.1 MG/DL


(2.5-4.9)


 


Magnesium Level


  1.6 MG/DL


(1.8-2.4)  L


 


Total Bilirubin


  1.5 MG/DL


(0.2-1.0)  H


 


Direct Bilirubin


  0.8 MG/DL


(0.0-0.3)  H


 


Aspartate Amino Transf


(AST/SGOT) 128 U/L


(15-37)  H


 


Alanine Aminotransferase


(ALT/SGPT) 183 U/L


(12-78)  H


 


Alkaline Phosphatase


  170 U/L


()  H


 


Pro-B-Type Natriuretic Peptide


  33194 pg/mL


(0-125)  H


 


Total Protein


  7.0 G/DL


(6.4-8.2)


 


Albumin


  2.4 G/DL


(3.4-5.0)  L








Objective


HEAD AND NECK:  Positive jugular venous distention.


LUNGS:  Decreased breath sounds.


CARDIOVASCULAR:  Irregularly irregular S1 and S2 with no gallop or murmur.


ABDOMEN:  Soft.


EXTREMITIES:  There is 2+ pitting edema.











ANNA IBRAHIM Jan 22, 2018 13:27

## 2018-01-22 NOTE — NEPHROLOGY PROGRESS NOTE
Assessment/Plan


Problem List:  


(1) Acute on chronic renal failure


(2) Cardiomyopathy


(3) Atrial flutter


(4) Pleural effusion


(5) Diabetes mellitus


Assessment





Urine positive for cocaine


Low K of 2.5 today





Renal failure- Acute vs Chronic OR Acute superimposed on chronic Cr down 1.4


DM ? Nephropathy


Atrial Flutter-


Pleural effusion / Bilateral LE edema


Cardiomyopathy


Plan





Plan:


K supplement


mag supplement


gentle diuresis


optimize cardiac status


2D Echo 20% EjFx


Kidney RICHARD WNL


flomax


gastric support


monitor renal parameters


Avoid Nephrotoxics


Hold Metformin


DC statins in view of high LFTs





Subjective


ROS Limited/Unobtainable:  No


Constitutional:  Reports: malaise





Objective


Objective





Last 24 Hour Vital Signs








  Date Time  Temp Pulse Resp B/P (MAP) Pulse Ox O2 Delivery O2 Flow Rate FiO2


 


1/22/18 12:00  76      


 


1/22/18 09:05  80  119/71    


 


1/22/18 08:00  81      


 


1/22/18 08:00 97.0 80 20 119/71 98 Room Air  


 


1/22/18 04:13 96.6 70 18 116/75 98 Room Air  


 


1/22/18 04:00  74      


 


1/22/18 00:14 97.5 71 18 100/61 97 Nasal Cannula  


 


1/22/18 00:00  76      


 


1/21/18 21:13  82  132/82    


 


1/21/18 20:00  80      


 


1/21/18 20:00 97.9 73 20 132/82 100 Nasal Cannula 2.0 


 


1/21/18 16:00  83      


 


1/21/18 16:00 97.4 78 20 128/72 94 Room Air  

















Intake and Output  


 


 1/21/18 1/22/18





 19:00 07:00


 


Intake Total 750 ml 


 


Output Total 1250 ml 400 ml


 


Balance -500 ml -400 ml


 


  


 


Intake Oral 750 ml 


 


Output Urine Total 1250 ml 400 ml


 


# Voids 1 








Laboratory Tests


1/22/18 06:00: 


White Blood Count 9.9, Red Blood Count 4.71, Hemoglobin 13.3L, Hematocrit 43.2, 

Mean Corpuscular Volume 92, Mean Corpuscular Hemoglobin 28.2, Mean Corpuscular 

Hemoglobin Concent 30.7L, Red Cell Distribution Width 17.7H, Platelet Count 141L

, Mean Platelet Volume 9.7, Neutrophils (%) (Auto) 84.9H, Lymphocytes (%) (Auto

) 7.7L, Monocytes (%) (Auto) 7.2, Eosinophils (%) (Auto) 0.0, Basophils (%) (

Auto) 0.2, Sodium Level 147H, Potassium Level 2.5*L, Chloride Level 104, Carbon 

Dioxide Level 36H, Anion Gap 8, Blood Urea Nitrogen 54H, Creatinine 1.4H, 

Estimat Glomerular Filtration Rate 50.7, Glucose Level 107H, Calcium Level 8.5, 

Phosphorus Level 3.1, Magnesium Level 1.6L, Total Bilirubin 1.5H, Direct 

Bilirubin 0.8H, Aspartate Amino Transf (AST/SGOT) 128H, Alanine 

Aminotransferase (ALT/SGPT) 183H, Alkaline Phosphatase 170H, Pro-B-Type 

Natriuretic Peptide 52428S, Total Protein 7.0, Albumin 2.4L


Height (Feet):  5


Height (Inches):  11.00


Weight (Pounds):  157


General Appearance:  no apparent distress, lethargic


Cardiovascular:  normal rate, arrhythmia


Respiratory/Chest:  decreased breath sounds


Abdomen:  soft, distended


Objective


no change-











IVANA CHAIDEZ Jan 22, 2018 15:49

## 2018-01-22 NOTE — INTERNAL MED PROGRESS NOTE
Subjective


Date of Service:  Jan 22, 2018


Physician Name


Kendall Huff


Attending Physician


Dejon Mcclendon MD





Current Medications








 Medications


  (Trade)  Dose


 Ordered  Sig/Alice


 Route


 PRN Reason  Start Time


 Stop Time Status Last Admin


Dose Admin


 


 Acetaminophen


  (Tylenol)  650 mg  Q6H  PRN


 ORAL


 Mild Pain/Temp > 100.5  1/18/18 12:00


 2/17/18 11:59  1/18/18 12:52


 


 


 Allopurinol


  (Allopurinol)  300 mg  DAILY


 ORAL


   1/19/18 09:00


 2/18/18 08:59  1/22/18 09:05


 


 


 Apixaban


  (Eliquis)  5 mg  BID


 ORAL


   1/22/18 18:00


 2/21/18 17:59   


 


 


 Aspirin


  (ASA)  81 mg  DAILY


 ORAL


   1/18/18 12:30


 2/17/18 12:29  1/22/18 09:05


 


 


 Dextrose


  (Dextrose 50%)    STAT  PRN


 IV


 Hypoglycemia  1/18/18 12:00


 2/17/18 11:59  1/19/18 16:18


 


 


 Docusate Sodium


  (Colace)  100 mg  THREE TIMES A  DAY


 ORAL


   1/19/18 18:00


 2/18/18 17:59  1/22/18 09:04


 


 


 Furosemide


  (Lasix)  80 mg  EVERY 12  HOURS


 IV


   1/18/18 21:00


 2/17/18 20:59  1/21/18 08:17


 


 


 Insulin Aspart


  (NovoLOG)    BEFORE MEALS AND  HS


 SUBQ


   1/18/18 16:30


 2/17/18 16:29  1/22/18 11:47


 


 


 Lansoprazole


  (Prevacid)  30 mg  DAILY


 ORAL


   1/18/18 17:15


 2/17/18 17:14  1/22/18 09:04


 


 


 Metoprolol


 Tartrate


  (Lopressor)  100 mg  Q12HR


 ORAL


   1/19/18 21:00


 2/18/18 20:59  1/22/18 09:05


 


 


 Ondansetron HCl


  (Zofran)  4 mg  Q4H  PRN


 IVP


 Nausea & Vomiting  1/18/18 12:00


 2/17/18 11:59   


 


 


 Potassium


 Chloride 50 meq/


 Sodium Chloride  575 ml @ 


 115 mls/hr  ONCE  ONCE


 IVPB


   1/22/18 11:15


 1/22/18 16:14  1/22/18 11:26


 


 


 Sevelamer


 Carbonate


  (Renvela)  800 mg  THREE TIMES A  DAY


 ORAL


   1/19/18 18:00


 2/18/18 17:59  1/22/18 09:04


 


 


 Tamsulosin HCl


  (Flomax)  0.4 mg  BEDTIME


 ORAL


   1/18/18 21:00


 2/17/18 20:59  1/21/18 21:13


 








Allergies:  


Coded Allergies:  


     NO KNOWN ALLERGIES (Verified  Allergy, Unknown, 1/18/18)


ROS Limited/Unobtainable:  No


Constitutional:  Reports: no symptoms


HEENT:  Reports: no symptoms


Cardiovascular:  Reports: no symptoms


Respiratory:  Reports: no symptoms


Gastrointestinal/Abdominal:  Reports: no symptoms


Genitourinary:  Reports: no symptoms


Neurologic/Psychiatric:  Reports: no symptoms


Subjective


66 YO M admitted with shortness of breath.  Now new cardiomyopathy, CHF and 

renal failure.  Await transfer to [facility] for cardiac cath.  Cover for 

[facility]-Dr Mcclendon.





Objective





Last Vital Signs








  Date Time  Temp Pulse Resp B/P (MAP) Pulse Ox O2 Delivery O2 Flow Rate FiO2


 


1/22/18 09:05  80  119/71    


 


1/22/18 08:00 97.0  20  98 Room Air  


 


1/21/18 20:00       2.0 











Laboratory Tests








Test


  1/22/18


06:00


 


White Blood Count


  9.9 K/UL


(4.8-10.8)


 


Red Blood Count


  4.71 M/UL


(4.70-6.10)


 


Hemoglobin


  13.3 G/DL


(14.2-18.0)  L


 


Hematocrit


  43.2 %


(42.0-52.0)


 


Mean Corpuscular Volume 92 FL (80-99)  


 


Mean Corpuscular Hemoglobin


  28.2 PG


(27.0-31.0)


 


Mean Corpuscular Hemoglobin


Concent 30.7 G/DL


(32.0-36.0)  L


 


Red Cell Distribution Width


  17.7 %


(11.6-14.8)  H


 


Platelet Count


  141 K/UL


(150-450)  L


 


Mean Platelet Volume


  9.7 FL


(6.5-10.1)


 


Neutrophils (%) (Auto)


  84.9 %


(45.0-75.0)  H


 


Lymphocytes (%) (Auto)


  7.7 %


(20.0-45.0)  L


 


Monocytes (%) (Auto)


  7.2 %


(1.0-10.0)


 


Eosinophils (%) (Auto)


  0.0 %


(0.0-3.0)


 


Basophils (%) (Auto)


  0.2 %


(0.0-2.0)


 


Sodium Level


  147 MMOL/L


(136-145)  H


 


Potassium Level


  2.5 MMOL/L


(3.5-5.1)  *L


 


Chloride Level


  104 MMOL/L


()


 


Carbon Dioxide Level


  36 MMOL/L


(21-32)  H


 


Anion Gap


  8 mmol/L


(5-15)


 


Blood Urea Nitrogen


  54 mg/dL


(7-18)  H


 


Creatinine


  1.4 MG/DL


(0.55-1.30)  H


 


Estimat Glomerular Filtration


Rate 50.7 mL/min


(>60)


 


Glucose Level


  107 MG/DL


()  H


 


Calcium Level


  8.5 MG/DL


(8.5-10.1)


 


Phosphorus Level


  3.1 MG/DL


(2.5-4.9)


 


Magnesium Level


  1.6 MG/DL


(1.8-2.4)  L


 


Total Bilirubin


  1.5 MG/DL


(0.2-1.0)  H


 


Direct Bilirubin


  0.8 MG/DL


(0.0-0.3)  H


 


Aspartate Amino Transf


(AST/SGOT) 128 U/L


(15-37)  H


 


Alanine Aminotransferase


(ALT/SGPT) 183 U/L


(12-78)  H


 


Alkaline Phosphatase


  170 U/L


()  H


 


Pro-B-Type Natriuretic Peptide


  30036 pg/mL


(0-125)  H


 


Total Protein


  7.0 G/DL


(6.4-8.2)


 


Albumin


  2.4 G/DL


(3.4-5.0)  L

















Intake and Output  


 


 1/21/18 1/22/18





 19:00 07:00


 


Intake Total 750 ml 


 


Output Total 1250 ml 400 ml


 


Balance -500 ml -400 ml


 


  


 


Intake Oral 750 ml 


 


Output Urine Total 1250 ml 400 ml


 


# Voids 1 








Objective


General Appearance:  WD/WN, no apparent distress, alert


EENT:  PERRL/EOMI, normal ENT inspection


Neck:  non-tender, normal alignment, supple, normal inspection


Cardiovascular:  normal peripheral pulses, no gallop/murmur, no JVD, 

irregularly irregular


Respiratory/Chest:  chest wall non-tender, no accessory muscle use, respiratory 

distress, crackles/rales, rhonchi - bilaterally, expiratory wheezing


Abdomen:  normal bowel sounds, non tender, soft, no organomegaly, no mass


Extremities:  normal range of motion


Edema:  mild edema


Neurologic:  CNs II-XII grossly normal, no motor/sensory deficits


Skin:  normal pigmentation, warm/dry





Assessment/Plan


Problem List:  


(1) Hypercholesterolemia


Assessment & Plan:  Hold statin due to elevated Liver funct tests.





(2) Edema


(3) Elevated troponin


Assessment & Plan:  Troponin leak due to renal failure per cardiology-Dr Landaverde





(4) EF 20%


(5) Cardiomyopathy


Assessment & Plan:  New onset.  EF=20%.  See cardiology note.  Continue lasix 

and eliquis.





(6) Atrial flutter


Assessment & Plan:  Will require transesophageal echo and ablation-see 

cardiology note.  Continue lopressor per cardiology-Dr Landaverde





(7) Diabetes mellitus


Assessment & Plan:  Continue novolog sliding scale.





(8) Acute on chronic renal failure


Assessment & Plan:  See nephrology note- Dr Gutiérrez.





Assessment/Plan


Discharge planning:  [facility] bed available for cardiac cath.











KENDALL HUFF Jan 22, 2018 12:13

## 2018-01-23 VITALS — DIASTOLIC BLOOD PRESSURE: 73 MMHG | SYSTOLIC BLOOD PRESSURE: 108 MMHG

## 2018-01-23 VITALS — SYSTOLIC BLOOD PRESSURE: 120 MMHG | DIASTOLIC BLOOD PRESSURE: 78 MMHG

## 2018-01-23 VITALS — SYSTOLIC BLOOD PRESSURE: 107 MMHG | DIASTOLIC BLOOD PRESSURE: 77 MMHG

## 2018-01-23 VITALS — SYSTOLIC BLOOD PRESSURE: 126 MMHG | DIASTOLIC BLOOD PRESSURE: 75 MMHG

## 2018-01-23 VITALS — SYSTOLIC BLOOD PRESSURE: 104 MMHG | DIASTOLIC BLOOD PRESSURE: 68 MMHG

## 2018-01-23 VITALS — SYSTOLIC BLOOD PRESSURE: 115 MMHG | DIASTOLIC BLOOD PRESSURE: 69 MMHG

## 2018-01-23 LAB
ADD MANUAL DIFF: NO
ALBUMIN SERPL-MCNC: 2.3 G/DL (ref 3.4–5)
ALBUMIN/GLOB SERPL: 0.5 {RATIO} (ref 1–2.7)
ALP SERPL-CCNC: 190 U/L (ref 46–116)
ALT SERPL-CCNC: 176 U/L (ref 12–78)
ANION GAP SERPL CALC-SCNC: 8 MMOL/L (ref 5–15)
AST SERPL-CCNC: 107 U/L (ref 15–37)
BASOPHILS NFR BLD AUTO: 0.5 % (ref 0–2)
BILIRUB DIRECT SERPL-MCNC: 1.2 MG/DL (ref 0–0.3)
BILIRUB SERPL-MCNC: 1.8 MG/DL (ref 0.2–1)
BUN SERPL-MCNC: 45 MG/DL (ref 7–18)
CALCIUM SERPL-MCNC: 8.6 MG/DL (ref 8.5–10.1)
CHLORIDE SERPL-SCNC: 103 MMOL/L (ref 98–107)
CO2 SERPL-SCNC: 36 MMOL/L (ref 21–32)
CREAT SERPL-MCNC: 1.3 MG/DL (ref 0.55–1.3)
EOSINOPHIL NFR BLD AUTO: 0 % (ref 0–3)
ERYTHROCYTE [DISTWIDTH] IN BLOOD BY AUTOMATED COUNT: 18.4 % (ref 11.6–14.8)
GLOBULIN SER-MCNC: 4.4 G/DL
HCT VFR BLD CALC: 43.3 % (ref 42–52)
HGB BLD-MCNC: 13.3 G/DL (ref 14.2–18)
LYMPHOCYTES NFR BLD AUTO: 9.7 % (ref 20–45)
MCV RBC AUTO: 92 FL (ref 80–99)
MONOCYTES NFR BLD AUTO: 6.8 % (ref 1–10)
NEUTROPHILS NFR BLD AUTO: 83 % (ref 45–75)
PHOSPHATE SERPL-MCNC: 3 MG/DL (ref 2.5–4.9)
PLATELET # BLD: 130 K/UL (ref 150–450)
POTASSIUM SERPL-SCNC: 3.1 MMOL/L (ref 3.5–5.1)
RBC # BLD AUTO: 4.72 M/UL (ref 4.7–6.1)
SODIUM SERPL-SCNC: 147 MMOL/L (ref 136–145)
WBC # BLD AUTO: 9.6 K/UL (ref 4.8–10.8)

## 2018-01-23 RX ADMIN — APIXABAN SCH MG: 2.5 TABLET, FILM COATED ORAL at 08:48

## 2018-01-23 RX ADMIN — DOCUSATE SODIUM SCH MG: 100 CAPSULE, LIQUID FILLED ORAL at 12:58

## 2018-01-23 RX ADMIN — DOCUSATE SODIUM SCH MG: 100 CAPSULE, LIQUID FILLED ORAL at 08:47

## 2018-01-23 RX ADMIN — INSULIN ASPART SCH UNITS: 100 INJECTION, SOLUTION INTRAVENOUS; SUBCUTANEOUS at 11:59

## 2018-01-23 RX ADMIN — ASPIRIN 81 MG SCH MG: 81 TABLET ORAL at 08:48

## 2018-01-23 RX ADMIN — INSULIN ASPART SCH UNITS: 100 INJECTION, SOLUTION INTRAVENOUS; SUBCUTANEOUS at 21:43

## 2018-01-23 RX ADMIN — DOCUSATE SODIUM SCH MG: 100 CAPSULE, LIQUID FILLED ORAL at 17:08

## 2018-01-23 RX ADMIN — INSULIN ASPART SCH UNITS: 100 INJECTION, SOLUTION INTRAVENOUS; SUBCUTANEOUS at 16:48

## 2018-01-23 RX ADMIN — TAMSULOSIN HYDROCHLORIDE SCH MG: 0.4 CAPSULE ORAL at 21:56

## 2018-01-23 RX ADMIN — INSULIN ASPART SCH UNITS: 100 INJECTION, SOLUTION INTRAVENOUS; SUBCUTANEOUS at 06:30

## 2018-01-23 RX ADMIN — APIXABAN SCH MG: 2.5 TABLET, FILM COATED ORAL at 17:08

## 2018-01-23 NOTE — CARDIAC ELECTROPHYSIOLOGY PN
Assessment/Plan


Assessment/Plan


1. Troponin leak.  Due to renal failure and congestive heart failure.  His 

creatinine was 2.4.   Continue on Lipitor, metoprolol,


and aspirin. Cardiac catheterization in view of also severely newly diagnosed 

cardiomyopathy and long runs of VT after renal fx improves.





2. Newly diagnosed cardiomyopathy, ejection fraction of only 20%.  BNP > 75486. 

On Lasix  60 mg IV b.i.d. and Lopressor


     Hold off on lisinopril and Aldactone in view of renal failure and 

hyperkalemia.  





3. Atrial flutter with rapid ventricular response.HR better on metoprolol 100 

mg b.i.d. and Eliquis 2.5 bid. 


    Benefits from KAITLIN and atrial flutter ablation after cardiac cath. 





4. History of cocaine use.   





5. Renal failure.  Cr 2.4 now 1.3. F/U by Dr. Gutiérrez.


6. Diabetes.


7. Hyperlipidemia.





BONILLA RN





Subjective


Subjective


In flutter with rate in 90s. No chest pain or SOB. No VT overnight





Objective





Last 24 Hour Vital Signs








  Date Time  Temp Pulse Resp B/P (MAP) Pulse Ox O2 Delivery O2 Flow Rate FiO2


 


1/23/18 12:00  80      


 


1/23/18 12:00 97.2 81 19 104/68 99 Nasal Cannula 2.0 


 


1/23/18 08:47  92  126/75    


 


1/23/18 08:00  86      


 


1/23/18 08:00 97.0 92 20 126/75 95 Room Air  


 


1/23/18 04:00  78      


 


1/23/18 04:00 95.9 87 20 120/78 100 Nasal Cannula  


 


1/23/18 00:00 97.8 92 18 115/69 100 Nasal Cannula  


 


1/23/18 00:00  78      


 


1/22/18 20:52  85  100/56    


 


1/22/18 20:00  87      


 


1/22/18 20:00 97.5 89 20 104/75 99 Room Air  


 


1/22/18 16:00  85      


 


1/22/18 16:00 97.3 83 20 100/56 97 Room Air  

















Intake and Output  


 


 1/22/18 1/23/18





 19:00 07:00


 


Intake Total 905 ml 300 ml


 


Output Total 400 ml 250 ml


 


Balance 505 ml 50 ml


 


  


 


Intake Oral 360 ml 300 ml


 


IV Total 545 ml 


 


Output Urine Total 400 ml 250 ml











Laboratory Tests








Test


  1/23/18


08:00 1/23/18


10:45


 


White Blood Count


  9.6 K/UL


(4.8-10.8) 


 


 


Red Blood Count


  4.72 M/UL


(4.70-6.10) 


 


 


Hemoglobin


  13.3 G/DL


(14.2-18.0)  L 


 


 


Hematocrit


  43.3 %


(42.0-52.0) 


 


 


Mean Corpuscular Volume 92 FL (80-99)   


 


Mean Corpuscular Hemoglobin


  28.2 PG


(27.0-31.0) 


 


 


Mean Corpuscular Hemoglobin


Concent 30.7 G/DL


(32.0-36.0)  L 


 


 


Red Cell Distribution Width


  18.4 %


(11.6-14.8)  H 


 


 


Platelet Count


  130 K/UL


(150-450)  L 


 


 


Mean Platelet Volume


  8.6 FL


(6.5-10.1) 


 


 


Neutrophils (%) (Auto)


  83.0 %


(45.0-75.0)  H 


 


 


Lymphocytes (%) (Auto)


  9.7 %


(20.0-45.0)  L 


 


 


Monocytes (%) (Auto)


  6.8 %


(1.0-10.0) 


 


 


Eosinophils (%) (Auto)


  0.0 %


(0.0-3.0) 


 


 


Basophils (%) (Auto)


  0.5 %


(0.0-2.0) 


 


 


Sodium Level


  147 MMOL/L


(136-145)  H 


 


 


Potassium Level


  3.1 MMOL/L


(3.5-5.1)  L 


 


 


Chloride Level


  103 MMOL/L


() 


 


 


Carbon Dioxide Level


  36 MMOL/L


(21-32)  H 


 


 


Anion Gap


  8 mmol/L


(5-15) 


 


 


Blood Urea Nitrogen


  45 mg/dL


(7-18)  H 


 


 


Creatinine


  1.3 MG/DL


(0.55-1.30) 


 


 


Estimat Glomerular Filtration


Rate 55.2 mL/min


(>60) 


 


 


Glucose Level


  119 MG/DL


()  H 


 


 


Uric Acid


  10.0 MG/DL


(2.6-7.2)  H 


 


 


Calcium Level


  8.6 MG/DL


(8.5-10.1) 


 


 


Phosphorus Level


  3.0 MG/DL


(2.5-4.9) 


 


 


Magnesium Level


  2.2 MG/DL


(1.8-2.4) 


 


 


Total Bilirubin


  1.8 MG/DL


(0.2-1.0)  H 


 


 


Direct Bilirubin


  1.2 MG/DL


(0.0-0.3)  H 


 


 


Aspartate Amino Transf


(AST/SGOT) 107 U/L


(15-37)  H 


 


 


Alanine Aminotransferase


(ALT/SGPT) 176 U/L


(12-78)  H 


 


 


Alkaline Phosphatase


  190 U/L


()  H 


 


 


Troponin I


  0.236 ng/mL


(0.000-0.056) 


 


 


C-Reactive Protein,


Quantitative 64.4 mg/dL


(0.00-0.90)  H 


 


 


Pro-B-Type Natriuretic Peptide


  8576 pg/mL


(0-125)  H 


 


 


Total Protein


  6.7 G/DL


(6.4-8.2) 


 


 


Albumin


  2.3 G/DL


(3.4-5.0)  L 


 


 


Globulin 4.4 g/dL   


 


Albumin/Globulin Ratio


  0.5 (1.0-2.7)


L 


 


 


Hepatitis A IgM Antibody  Pending  


 


Hepatitis B Surface Antigen  Pending  


 


Hepatitis B Core IgM Antibody  Pending  


 


Hepatitis C Antibody  Pending  








Objective


HEAD AND NECK:  Positive jugular venous distention.


LUNGS:  Decreased breath sounds.


CARDIOVASCULAR:  Irregularly irregular S1 and S2 with no gallop or murmur.


ABDOMEN:  Soft.


EXTREMITIES:  2+ pitting edema.











ANNA IBRAHIM Jan 23, 2018 15:23

## 2018-01-23 NOTE — PULMONOLOGY PROGRESS NOTE
Assessment/Plan


Problems:  


(1) Pleural effusion


(2) Atrial flutter


(3) Diabetes mellitus


(4) EF 20%


Assessment/Plan


f/u renal function


still has Vtach


cardio recommendations


adjust cardiac meds.


d/w Dr. Landaverde


abdominal US for increased liver enzymes and bilirubin, rule out cirrhosis, 

hepatitis?





K supplement


troponin decreasing





Subjective


ROS Limited/Unobtainable:  No


Constitutional:  Reports: no symptoms


Respiratory:  Reports: no symptoms


Allergies:  


Coded Allergies:  


     NO KNOWN ALLERGIES (Verified  Allergy, Unknown, 1/18/18)





Objective





Last 24 Hour Vital Signs








  Date Time  Temp Pulse Resp B/P (MAP) Pulse Ox O2 Delivery O2 Flow Rate FiO2


 


1/23/18 08:47  92  126/75    


 


1/23/18 08:00 97.0 92 20 126/75 95 Room Air  


 


1/23/18 04:00  78      


 


1/23/18 04:00 95.9 87 20 120/78 100 Nasal Cannula  


 


1/23/18 00:00 97.8 92 18 115/69 100 Nasal Cannula  


 


1/23/18 00:00  78      


 


1/22/18 20:52  85  100/56    


 


1/22/18 20:00  87      


 


1/22/18 20:00 97.5 89 20 104/75 99 Room Air  


 


1/22/18 16:00  85      


 


1/22/18 16:00 97.3 83 20 100/56 97 Room Air  


 


1/22/18 12:00  76      


 


1/22/18 12:00 97.3 71 20 113/65 100 Room Air  

















Intake and Output  


 


 1/22/18 1/23/18





 19:00 07:00


 


Intake Total 905 ml 300 ml


 


Output Total 400 ml 250 ml


 


Balance 505 ml 50 ml


 


  


 


Intake Oral 360 ml 300 ml


 


IV Total 545 ml 


 


Output Urine Total 400 ml 250 ml








Objective


General Appearance:  cachectic


HEENT:  normocephalic, atraumatic


Respiratory/Chest:  chest wall non-tender, lungs clear


Cardiovascular:  normal peripheral pulses, normal rate


Abdomen:  soft, non tender


Extremities:  no cyanosis


Skin:  no rash


Laboratory Tests


1/23/18 08:00: 


White Blood Count 9.6, Red Blood Count 4.72, Hemoglobin 13.3L, Hematocrit 43.3, 

Mean Corpuscular Volume 92, Mean Corpuscular Hemoglobin 28.2, Mean Corpuscular 

Hemoglobin Concent 30.7L, Red Cell Distribution Width 18.4H, Platelet Count 130L

, Mean Platelet Volume 8.6, Neutrophils (%) (Auto) 83.0H, Lymphocytes (%) (Auto

) 9.7L, Monocytes (%) (Auto) 6.8, Eosinophils (%) (Auto) 0.0, Basophils (%) (

Auto) 0.5, Sodium Level 147H, Potassium Level 3.1L, Chloride Level 103, Carbon 

Dioxide Level 36H, Anion Gap 8, Blood Urea Nitrogen 45H, Creatinine 1.3, 

Estimat Glomerular Filtration Rate 55.2, Glucose Level 119H, Uric Acid [Pending]

, Calcium Level 8.6, Phosphorus Level 3.0, Magnesium Level 2.2, Total Bilirubin 

1.8H, Direct Bilirubin 1.2H, Aspartate Amino Transf (AST/SGOT) 107H, Alanine 

Aminotransferase (ALT/SGPT) 176H, Alkaline Phosphatase 190H, Troponin I 0.236H, 

C-Reactive Protein, Quantitative [Pending], Pro-B-Type Natriuretic Peptide [

Pending], Total Protein 6.7, Albumin 2.3L, Globulin 4.4, Albumin/Globulin Ratio 

0.5L





Current Medications








 Medications


  (Trade)  Dose


 Ordered  Sig/Alice


 Route


 PRN Reason  Start Time


 Stop Time Status Last Admin


Dose Admin


 


 Acetaminophen


  (Tylenol)  650 mg  Q6H  PRN


 ORAL


 Mild Pain/Temp > 100.5  1/18/18 12:00


 2/17/18 11:59  1/22/18 14:08


 


 


 Allopurinol


  (Allopurinol)  300 mg  DAILY


 ORAL


   1/19/18 09:00


 2/18/18 08:59  1/23/18 08:47


 


 


 Apixaban


  (Eliquis)  5 mg  BID


 ORAL


   1/22/18 18:00


 2/21/18 17:59  1/23/18 08:48


 


 


 Aspirin


  (ASA)  81 mg  DAILY


 ORAL


   1/18/18 12:30


 2/17/18 12:29  1/23/18 08:48


 


 


 Dextrose


  (Dextrose 50%)    STAT  PRN


 IV


 Hypoglycemia  1/18/18 12:00


 2/17/18 11:59  1/19/18 16:18


 


 


 Docusate Sodium


  (Colace)  100 mg  THREE TIMES A  DAY


 ORAL


   1/19/18 18:00


 2/18/18 17:59  1/23/18 08:47


 


 


 Furosemide


  (Lasix)  60 mg  EVERY 12  HOURS


 IV


   1/22/18 21:00


 2/21/18 20:59  1/23/18 08:48


 


 


 Insulin Aspart


  (NovoLOG)    BEFORE MEALS AND  HS


 SUBQ


   1/18/18 16:30


 2/17/18 16:29  1/22/18 20:54


 


 


 Lansoprazole


  (Prevacid)  30 mg  DAILY


 ORAL


   1/18/18 17:15


 2/17/18 17:14  1/23/18 08:46


 


 


 Metoprolol


 Tartrate


  (Lopressor)  100 mg  Q12HR


 ORAL


   1/19/18 21:00


 2/18/18 20:59  1/23/18 08:47


 


 


 Ondansetron HCl


  (Zofran)  4 mg  Q4H  PRN


 IVP


 Nausea & Vomiting  1/18/18 12:00


 2/17/18 11:59   


 


 


 Potassium Chloride


  (K-Dur)  40 meq  TWICE A  DAY


 ORAL


   1/22/18 18:00


 2/21/18 17:59  1/23/18 08:46


 


 


 Tamsulosin HCl


  (Flomax)  0.4 mg  BEDTIME


 ORAL


   1/18/18 21:00


 2/17/18 20:59  1/22/18 20:52


 

















JAVID CHAVARRIA Jan 23, 2018 10:02

## 2018-01-23 NOTE — NEPHROLOGY PROGRESS NOTE
Assessment/Plan


Problem List:  


(1) Acute on chronic renal failure


(2) Cardiomyopathy


(3) Atrial flutter


(4) Pleural effusion


(5) Diabetes mellitus


Assessment





Urine positive for cocaine


Low K of 2.5 today





Renal failure- Acute vs Chronic OR Acute superimposed on chronic Cr down 1.4


DM ? Nephropathy


Atrial Flutter-


Pleural effusion / Bilateral LE edema


Cardiomyopathy


Plan





Plan:


K supplement


mag supplement


gentle diuresis


optimize cardiac status


2D Echo 20% EjFx


Kidney RICHARD WNL


flomax


gastric support


monitor renal parameters


Avoid Nephrotoxics


Hold Metformin


DC statins in view of high LFTs





Subjective


ROS Limited/Unobtainable:  No


Constitutional:  Reports: malaise





Objective


Objective





Last 24 Hour Vital Signs








  Date Time  Temp Pulse Resp B/P (MAP) Pulse Ox O2 Delivery O2 Flow Rate FiO2


 


1/23/18 08:47  92  126/75    


 


1/23/18 08:00 97.0 92 20 126/75 95 Room Air  


 


1/23/18 04:00  78      


 


1/23/18 04:00 95.9 87 20 120/78 100 Nasal Cannula  


 


1/23/18 00:00 97.8 92 18 115/69 100 Nasal Cannula  


 


1/23/18 00:00  78      


 


1/22/18 20:52  85  100/56    


 


1/22/18 20:00  87      


 


1/22/18 20:00 97.5 89 20 104/75 99 Room Air  


 


1/22/18 16:00  85      


 


1/22/18 16:00 97.3 83 20 100/56 97 Room Air  

















Intake and Output  


 


 1/22/18 1/23/18





 19:00 07:00


 


Intake Total 905 ml 300 ml


 


Output Total 400 ml 250 ml


 


Balance 505 ml 50 ml


 


  


 


Intake Oral 360 ml 300 ml


 


IV Total 545 ml 


 


Output Urine Total 400 ml 250 ml








Laboratory Tests


1/23/18 08:00: 


White Blood Count 9.6, Red Blood Count 4.72, Hemoglobin 13.3L, Hematocrit 43.3, 

Mean Corpuscular Volume 92, Mean Corpuscular Hemoglobin 28.2, Mean Corpuscular 

Hemoglobin Concent 30.7L, Red Cell Distribution Width 18.4H, Platelet Count 130L

, Mean Platelet Volume 8.6, Neutrophils (%) (Auto) 83.0H, Lymphocytes (%) (Auto

) 9.7L, Monocytes (%) (Auto) 6.8, Eosinophils (%) (Auto) 0.0, Basophils (%) (

Auto) 0.5, Sodium Level 147H, Potassium Level 3.1L, Chloride Level 103, Carbon 

Dioxide Level 36H, Anion Gap 8, Blood Urea Nitrogen 45H, Creatinine 1.3, 

Estimat Glomerular Filtration Rate 55.2, Glucose Level 119H, Uric Acid 10.0H, 

Calcium Level 8.6, Phosphorus Level 3.0, Magnesium Level 2.2, Total Bilirubin 

1.8H, Direct Bilirubin 1.2H, Aspartate Amino Transf (AST/SGOT) 107H, Alanine 

Aminotransferase (ALT/SGPT) 176H, Alkaline Phosphatase 190H, Troponin I 0.236H, 

C-Reactive Protein, Quantitative 64.4H, Pro-B-Type Natriuretic Peptide 8576H, 

Total Protein 6.7, Albumin 2.3L, Globulin 4.4, Albumin/Globulin Ratio 0.5L


1/23/18 10:45: 


Hepatitis A IgM Antibody [Pending], Hepatitis B Surface Antigen [Pending], 

Hepatitis B Core IgM Antibody [Pending], Hepatitis C Antibody [Pending]


Height (Feet):  5


Height (Inches):  11.00


Weight (Pounds):  162


General Appearance:  no apparent distress


Objective


no change-











IVANA CHAIDEZ Jan 23, 2018 12:02

## 2018-01-23 NOTE — INTERNAL MED PROGRESS NOTE
Subjective


Date of Service:  Jan 23, 2018


Physician Name


Kendall Huff


Attending Physician


Dejon Mcclendon MD





Current Medications








 Medications


  (Trade)  Dose


 Ordered  Sig/Alice


 Route


 PRN Reason  Start Time


 Stop Time Status Last Admin


Dose Admin


 


 Acetaminophen


  (Tylenol)  650 mg  Q6H  PRN


 ORAL


 Mild Pain/Temp > 100.5  1/18/18 12:00


 2/17/18 11:59  1/22/18 14:08


 


 


 Allopurinol


  (Allopurinol)  300 mg  DAILY


 ORAL


   1/19/18 09:00


 2/18/18 08:59  1/23/18 08:47


 


 


 Apixaban


  (Eliquis)  5 mg  BID


 ORAL


   1/22/18 18:00


 2/21/18 17:59  1/23/18 08:48


 


 


 Aspirin


  (ASA)  81 mg  DAILY


 ORAL


   1/18/18 12:30


 2/17/18 12:29  1/23/18 08:48


 


 


 Dextrose


  (Dextrose 50%)    STAT  PRN


 IV


 Hypoglycemia  1/18/18 12:00


 2/17/18 11:59  1/19/18 16:18


 


 


 Docusate Sodium


  (Colace)  100 mg  THREE TIMES A  DAY


 ORAL


   1/19/18 18:00


 2/18/18 17:59  1/23/18 12:58


 


 


 Furosemide


  (Lasix)  60 mg  EVERY 12  HOURS


 IV


   1/22/18 21:00


 2/21/18 20:59  1/23/18 08:48


 


 


 Insulin Aspart


  (NovoLOG)    BEFORE MEALS AND  HS


 SUBQ


   1/18/18 16:30


 2/17/18 16:29  1/23/18 11:59


 


 


 Lansoprazole


  (Prevacid)  30 mg  DAILY


 ORAL


   1/18/18 17:15


 2/17/18 17:14  1/23/18 08:46


 


 


 Metoprolol


 Tartrate


  (Lopressor)  100 mg  Q12HR


 ORAL


   1/19/18 21:00


 2/18/18 20:59  1/23/18 08:47


 


 


 Ondansetron HCl


  (Zofran)  4 mg  Q4H  PRN


 IVP


 Nausea & Vomiting  1/18/18 12:00


 2/17/18 11:59   


 


 


 Potassium Chloride


  (K-Dur)  40 meq  TWICE A  DAY


 ORAL


   1/22/18 18:00


 2/21/18 17:59  1/23/18 08:46


 


 


 Tamsulosin HCl


  (Flomax)  0.4 mg  BEDTIME


 ORAL


   1/18/18 21:00


 2/17/18 20:59  1/22/18 20:52


 








Allergies:  


Coded Allergies:  


     NO KNOWN ALLERGIES (Verified  Allergy, Unknown, 1/18/18)


ROS Limited/Unobtainable:  No


Constitutional:  Reports: no symptoms


HEENT:  Reports: no symptoms


Cardiovascular:  Reports: no symptoms


Respiratory:  Reports: shortness of breath


Gastrointestinal/Abdominal:  Reports: no symptoms


Genitourinary:  Reports: no symptoms


Neurologic/Psychiatric:  Reports: no symptoms


Subjective


68 YO M admitted with shortness of breath.  Now new cardiomyopathy, CHF and 

renal failure.  Await transfer to [facility] for cardiac cath.  Cover for 

[facility]-Dr Mcclendon.





Objective





Last Vital Signs








  Date Time  Temp Pulse Resp B/P (MAP) Pulse Ox O2 Delivery O2 Flow Rate FiO2


 


1/23/18 16:00 97.2 89 20 108/73 98 Nasal Cannula 2.0 











Laboratory Tests








Test


  1/23/18


08:00 1/23/18


10:45


 


White Blood Count


  9.6 K/UL


(4.8-10.8) 


 


 


Red Blood Count


  4.72 M/UL


(4.70-6.10) 


 


 


Hemoglobin


  13.3 G/DL


(14.2-18.0)  L 


 


 


Hematocrit


  43.3 %


(42.0-52.0) 


 


 


Mean Corpuscular Volume 92 FL (80-99)   


 


Mean Corpuscular Hemoglobin


  28.2 PG


(27.0-31.0) 


 


 


Mean Corpuscular Hemoglobin


Concent 30.7 G/DL


(32.0-36.0)  L 


 


 


Red Cell Distribution Width


  18.4 %


(11.6-14.8)  H 


 


 


Platelet Count


  130 K/UL


(150-450)  L 


 


 


Mean Platelet Volume


  8.6 FL


(6.5-10.1) 


 


 


Neutrophils (%) (Auto)


  83.0 %


(45.0-75.0)  H 


 


 


Lymphocytes (%) (Auto)


  9.7 %


(20.0-45.0)  L 


 


 


Monocytes (%) (Auto)


  6.8 %


(1.0-10.0) 


 


 


Eosinophils (%) (Auto)


  0.0 %


(0.0-3.0) 


 


 


Basophils (%) (Auto)


  0.5 %


(0.0-2.0) 


 


 


Sodium Level


  147 MMOL/L


(136-145)  H 


 


 


Potassium Level


  3.1 MMOL/L


(3.5-5.1)  L 


 


 


Chloride Level


  103 MMOL/L


() 


 


 


Carbon Dioxide Level


  36 MMOL/L


(21-32)  H 


 


 


Anion Gap


  8 mmol/L


(5-15) 


 


 


Blood Urea Nitrogen


  45 mg/dL


(7-18)  H 


 


 


Creatinine


  1.3 MG/DL


(0.55-1.30) 


 


 


Estimat Glomerular Filtration


Rate 55.2 mL/min


(>60) 


 


 


Glucose Level


  119 MG/DL


()  H 


 


 


Uric Acid


  10.0 MG/DL


(2.6-7.2)  H 


 


 


Calcium Level


  8.6 MG/DL


(8.5-10.1) 


 


 


Phosphorus Level


  3.0 MG/DL


(2.5-4.9) 


 


 


Magnesium Level


  2.2 MG/DL


(1.8-2.4) 


 


 


Total Bilirubin


  1.8 MG/DL


(0.2-1.0)  H 


 


 


Direct Bilirubin


  1.2 MG/DL


(0.0-0.3)  H 


 


 


Aspartate Amino Transf


(AST/SGOT) 107 U/L


(15-37)  H 


 


 


Alanine Aminotransferase


(ALT/SGPT) 176 U/L


(12-78)  H 


 


 


Alkaline Phosphatase


  190 U/L


()  H 


 


 


Troponin I


  0.236 ng/mL


(0.000-0.056) 


 


 


C-Reactive Protein,


Quantitative 64.4 mg/dL


(0.00-0.90)  H 


 


 


Pro-B-Type Natriuretic Peptide


  8576 pg/mL


(0-125)  H 


 


 


Total Protein


  6.7 G/DL


(6.4-8.2) 


 


 


Albumin


  2.3 G/DL


(3.4-5.0)  L 


 


 


Globulin 4.4 g/dL   


 


Albumin/Globulin Ratio


  0.5 (1.0-2.7)


L 


 


 


Hepatitis A IgM Antibody  Pending  


 


Hepatitis B Surface Antigen  Pending  


 


Hepatitis B Core IgM Antibody  Pending  


 


Hepatitis C Antibody  Pending  

















Intake and Output  


 


 1/22/18 1/23/18





 19:00 07:00


 


Intake Total 905 ml 300 ml


 


Output Total 400 ml 250 ml


 


Balance 505 ml 50 ml


 


  


 


Intake Oral 360 ml 300 ml


 


IV Total 545 ml 


 


Output Urine Total 400 ml 250 ml








Objective


General Appearance:  WD/WN, no apparent distress, alert


EENT:  PERRL/EOMI, normal ENT inspection


Neck:  non-tender, normal alignment, supple, normal inspection


Cardiovascular:  normal peripheral pulses, no gallop/murmur, no JVD, 

irregularly irregular


Respiratory/Chest:  chest wall non-tender, no accessory muscle use, respiratory 

distress, crackles/rales, rhonchi - bilaterally, expiratory wheezing


Abdomen:  normal bowel sounds, non tender, soft, no organomegaly, no mass


Extremities:  normal range of motion


Edema:  mild edema


Neurologic:  CNs II-XII grossly normal, no motor/sensory deficits


Skin:  normal pigmentation, warm/dry





Assessment/Plan


Problem List:  


(1) Hypercholesterolemia


Assessment & Plan:  Hold statin due to elevated Liver funct tests.





(2) Edema


(3) Elevated troponin


Assessment & Plan:  Troponin leak due to renal failure per cardiology-Dr Landaverde





(4) Cardiomyopathy


Assessment & Plan:  New onset.  EF=20%.  See cardiology note.  Continue lasix 

and eliquis.





(5) Atrial flutter


Assessment & Plan:  Will require transesophageal echo and ablation after 

cardiac cath-see cardiology note.  Continue lopressor per cardiology-Dr Landaverde





(6) Diabetes mellitus


Assessment & Plan:  Continue novolog sliding scale.





(7) Acute on chronic renal failure


Assessment & Plan:  See nephrology note- Dr Gutiérrez.





Assessment/Plan


Discharge planning:  Transfer to [facility] when bed available for cardiac cath-

see case management note.











KENDALL HUFF Jan 23, 2018 16:34

## 2018-01-24 VITALS — SYSTOLIC BLOOD PRESSURE: 130 MMHG | DIASTOLIC BLOOD PRESSURE: 45 MMHG

## 2018-01-24 VITALS — SYSTOLIC BLOOD PRESSURE: 131 MMHG | DIASTOLIC BLOOD PRESSURE: 73 MMHG

## 2018-01-24 VITALS — DIASTOLIC BLOOD PRESSURE: 69 MMHG | SYSTOLIC BLOOD PRESSURE: 125 MMHG

## 2018-01-24 VITALS — SYSTOLIC BLOOD PRESSURE: 137 MMHG | DIASTOLIC BLOOD PRESSURE: 79 MMHG

## 2018-01-24 VITALS — DIASTOLIC BLOOD PRESSURE: 74 MMHG | SYSTOLIC BLOOD PRESSURE: 131 MMHG

## 2018-01-24 VITALS — DIASTOLIC BLOOD PRESSURE: 69 MMHG | SYSTOLIC BLOOD PRESSURE: 120 MMHG

## 2018-01-24 LAB
ADD MANUAL DIFF: NO
ALBUMIN SERPL-MCNC: 2.3 G/DL (ref 3.4–5)
ALBUMIN/GLOB SERPL: 0.4 {RATIO} (ref 1–2.7)
ALP SERPL-CCNC: 185 U/L (ref 46–116)
ALT SERPL-CCNC: 163 U/L (ref 12–78)
ANION GAP SERPL CALC-SCNC: 10 MMOL/L (ref 5–15)
AST SERPL-CCNC: 78 U/L (ref 15–37)
BASOPHILS NFR BLD AUTO: 0.6 % (ref 0–2)
BILIRUB DIRECT SERPL-MCNC: 1.2 MG/DL (ref 0–0.3)
BILIRUB SERPL-MCNC: 2.1 MG/DL (ref 0.2–1)
BUN SERPL-MCNC: 45 MG/DL (ref 7–18)
CALCIUM SERPL-MCNC: 9.4 MG/DL (ref 8.5–10.1)
CHLORIDE SERPL-SCNC: 102 MMOL/L (ref 98–107)
CO2 SERPL-SCNC: 31 MMOL/L (ref 21–32)
CREAT SERPL-MCNC: 1.3 MG/DL (ref 0.55–1.3)
EOSINOPHIL NFR BLD AUTO: 0 % (ref 0–3)
ERYTHROCYTE [DISTWIDTH] IN BLOOD BY AUTOMATED COUNT: 18.1 % (ref 11.6–14.8)
GLOBULIN SER-MCNC: 5.3 G/DL
HCT VFR BLD CALC: 42.8 % (ref 42–52)
HGB BLD-MCNC: 13 G/DL (ref 14.2–18)
LYMPHOCYTES NFR BLD AUTO: 12.4 % (ref 20–45)
MCV RBC AUTO: 93 FL (ref 80–99)
MONOCYTES NFR BLD AUTO: 7.3 % (ref 1–10)
NEUTROPHILS NFR BLD AUTO: 79.7 % (ref 45–75)
PLATELET # BLD: 152 K/UL (ref 150–450)
POTASSIUM SERPL-SCNC: 4.3 MMOL/L (ref 3.5–5.1)
RBC # BLD AUTO: 4.61 M/UL (ref 4.7–6.1)
SODIUM SERPL-SCNC: 143 MMOL/L (ref 136–145)
WBC # BLD AUTO: 9.4 K/UL (ref 4.8–10.8)

## 2018-01-24 RX ADMIN — ASPIRIN 81 MG SCH MG: 81 TABLET ORAL at 08:41

## 2018-01-24 RX ADMIN — DOCUSATE SODIUM SCH MG: 100 CAPSULE, LIQUID FILLED ORAL at 17:35

## 2018-01-24 RX ADMIN — DOCUSATE SODIUM SCH MG: 100 CAPSULE, LIQUID FILLED ORAL at 12:28

## 2018-01-24 RX ADMIN — APIXABAN SCH MG: 2.5 TABLET, FILM COATED ORAL at 17:35

## 2018-01-24 RX ADMIN — INSULIN ASPART SCH UNITS: 100 INJECTION, SOLUTION INTRAVENOUS; SUBCUTANEOUS at 11:26

## 2018-01-24 RX ADMIN — INSULIN ASPART SCH UNITS: 100 INJECTION, SOLUTION INTRAVENOUS; SUBCUTANEOUS at 20:26

## 2018-01-24 RX ADMIN — APIXABAN SCH MG: 2.5 TABLET, FILM COATED ORAL at 08:41

## 2018-01-24 RX ADMIN — INSULIN ASPART SCH UNITS: 100 INJECTION, SOLUTION INTRAVENOUS; SUBCUTANEOUS at 06:00

## 2018-01-24 RX ADMIN — INSULIN ASPART SCH UNITS: 100 INJECTION, SOLUTION INTRAVENOUS; SUBCUTANEOUS at 16:13

## 2018-01-24 RX ADMIN — TAMSULOSIN HYDROCHLORIDE SCH MG: 0.4 CAPSULE ORAL at 20:26

## 2018-01-24 RX ADMIN — DOCUSATE SODIUM SCH MG: 100 CAPSULE, LIQUID FILLED ORAL at 08:40

## 2018-01-24 NOTE — PULMONOLOGY PROGRESS NOTE
Assessment/Plan


Problems:  


(1) Pleural effusion


(2) Atrial flutter


(3) Diabetes mellitus


(4) EF 20%


Assessment/Plan


f/u renal function


still a-flutter


cardio recommendations


adjust cardiac meds.


d/w Dr. Saima LIZARRAGA supplement


hepatitis panel pending


abdominal US negative





Subjective


ROS Limited/Unobtainable:  No


Constitutional:  Reports: no symptoms


HEENT:  Repors: no symptoms


Respiratory:  Reports: no symptoms


Allergies:  


Coded Allergies:  


     NO KNOWN ALLERGIES (Verified  Allergy, Unknown, 1/18/18)





Objective





Last 24 Hour Vital Signs








  Date Time  Temp Pulse Resp B/P (MAP) Pulse Ox O2 Delivery O2 Flow Rate FiO2


 


1/24/18 08:40  80  137/79    


 


1/24/18 08:00  88      


 


1/24/18 08:00 97.0  18 137/79 99 Nasal Cannula 2.0 


 


1/24/18 04:00 96.9  20 120/69 99 Nasal Cannula 2.0 


 


1/24/18 04:00  80      


 


1/24/18 00:00      Nasal Cannula 2.0 28


 


1/24/18 00:00 96.9 82 19 125/69 99 Nasal Cannula 2.0 


 


1/24/18 00:00     94 Nasal Cannula 2.0 28


 


1/23/18 23:40  83      


 


1/23/18 21:56  98  107/77    


 


1/23/18 20:48  102      


 


1/23/18 20:37 97.9 98 19 107/77 96 Nasal Cannula  


 


1/23/18 20:00     98 Nasal Cannula 2.0 


 


1/23/18 16:00 97.2 89 20 108/73 98 Nasal Cannula 2.0 


 


1/23/18 16:00  93      


 


1/23/18 12:00  80      


 


1/23/18 12:00 97.2 81 19 104/68 99 Nasal Cannula 2.0 

















Intake and Output  


 


 1/23/18 1/24/18





 19:00 07:00


 


Intake Total 560 ml 


 


Output Total  400 ml


 


Balance 560 ml -400 ml


 


  


 


Intake Oral 260 ml 


 


IV Total 300 ml 


 


Output Urine Total  400 ml


 


# Voids 2 








Objective


General Appearance:  cachectic


HEENT:  normocephalic, atraumatic


Respiratory/Chest:  chest wall non-tender, lungs clear


Cardiovascular:  normal peripheral pulses, normal rate


Abdomen:  soft, non tender


Extremities:  no cyanosis


Skin:  no rash


Laboratory Tests


1/24/18 08:20: 


White Blood Count 9.4, Red Blood Count 4.61L, Hemoglobin 13.0L, Hematocrit 42.8

, Mean Corpuscular Volume 93, Mean Corpuscular Hemoglobin 28.3, Mean 

Corpuscular Hemoglobin Concent 30.4L, Red Cell Distribution Width 18.1H, 

Platelet Count 152, Mean Platelet Volume 9.0, Neutrophils (%) (Auto) 79.7H, 

Lymphocytes (%) (Auto) 12.4L, Monocytes (%) (Auto) 7.3, Eosinophils (%) (Auto) 

0.0, Basophils (%) (Auto) 0.6, Sodium Level 143, Potassium Level 4.3, Chloride 

Level 102, Carbon Dioxide Level 31, Anion Gap 10, Blood Urea Nitrogen 45H, 

Creatinine 1.3, Estimat Glomerular Filtration Rate 55.2, Glucose Level 100, 

Calcium Level 9.4, Total Bilirubin 2.1H, Direct Bilirubin 1.2H, Aspartate Amino 

Transf (AST/SGOT) 78H, Alanine Aminotransferase (ALT/SGPT) 163H, Alkaline 

Phosphatase 185H, Pro-B-Type Natriuretic Peptide 8247H, Total Protein 7.6, 

Albumin 2.3L, Globulin 5.3, Albumin/Globulin Ratio 0.4L





Current Medications








 Medications


  (Trade)  Dose


 Ordered  Sig/Alice


 Route


 PRN Reason  Start Time


 Stop Time Status Last Admin


Dose Admin


 


 Acetaminophen


  (Tylenol)  650 mg  Q6H  PRN


 ORAL


 Mild Pain/Temp > 100.5  1/18/18 12:00


 2/17/18 11:59  1/22/18 14:08


 


 


 Allopurinol


  (Allopurinol)  300 mg  DAILY


 ORAL


   1/19/18 09:00


 2/18/18 08:59  1/24/18 08:41


 


 


 Apixaban


  (Eliquis)  5 mg  BID


 ORAL


   1/22/18 18:00


 2/21/18 17:59  1/24/18 08:41


 


 


 Aspirin


  (ASA)  81 mg  DAILY


 ORAL


   1/18/18 12:30


 2/17/18 12:29  1/24/18 08:41


 


 


 Dextrose


  (Dextrose 50%)    STAT  PRN


 IV


 Hypoglycemia  1/18/18 12:00


 2/17/18 11:59  1/19/18 16:18


 


 


 Docusate Sodium


  (Colace)  100 mg  THREE TIMES A  DAY


 ORAL


   1/19/18 18:00


 2/18/18 17:59  1/24/18 08:40


 


 


 Furosemide


  (Lasix)  40 mg  EVERY 12  HOURS


 IV


   1/24/18 21:00


 2/23/18 20:59   


 


 


 Hydralazine HCl


  (Apresoline)  10 mg  BID


 ORAL


   1/24/18 18:00


 2/23/18 17:59   


 


 


 Insulin Aspart


  (NovoLOG)    BEFORE MEALS AND  HS


 SUBQ


   1/18/18 16:30


 2/17/18 16:29  1/24/18 11:26


 


 


 Isosorbide


 Mononitrate


  (Imdur)  30 mg  DAILY


 ORAL


   1/25/18 09:00


 2/24/18 08:59   


 


 


 Lansoprazole


  (Prevacid)  30 mg  DAILY


 ORAL


   1/18/18 17:15


 2/17/18 17:14  1/24/18 08:39


 


 


 Metoprolol


 Tartrate


  (Lopressor)  100 mg  Q12HR


 ORAL


   1/19/18 21:00


 2/18/18 20:59  1/24/18 08:40


 


 


 Ondansetron HCl


  (Zofran)  4 mg  Q4H  PRN


 IVP


 Nausea & Vomiting  1/18/18 12:00


 2/17/18 11:59   


 


 


 Potassium Chloride


  (K-Dur)  40 meq  TWICE A  DAY


 ORAL


   1/22/18 18:00


 2/21/18 17:59  1/24/18 08:40


 


 


 Tamsulosin HCl


  (Flomax)  0.4 mg  BEDTIME


 ORAL


   1/18/18 21:00


 2/17/18 20:59  1/23/18 21:56


 

















JAVID CHAVARRIA Jan 24, 2018 11:36

## 2018-01-24 NOTE — INTERNAL MED PROGRESS NOTE
Subjective


Date of Service:  Jan 24, 2018


Physician Name


Kendall Huff


Attending Physician


Dejon Mcclendon MD





Current Medications








 Medications


  (Trade)  Dose


 Ordered  Sig/Alice


 Route


 PRN Reason  Start Time


 Stop Time Status Last Admin


Dose Admin


 


 Acetaminophen


  (Tylenol)  650 mg  Q6H  PRN


 ORAL


 Mild Pain/Temp > 100.5  1/18/18 12:00


 2/17/18 11:59  1/22/18 14:08


 


 


 Allopurinol


  (Allopurinol)  300 mg  DAILY


 ORAL


   1/19/18 09:00


 2/18/18 08:59  1/24/18 08:41


 


 


 Apixaban


  (Eliquis)  5 mg  BID


 ORAL


   1/22/18 18:00


 2/21/18 17:59  1/24/18 08:41


 


 


 Aspirin


  (ASA)  81 mg  DAILY


 ORAL


   1/18/18 12:30


 2/17/18 12:29  1/24/18 08:41


 


 


 Dextrose


  (Dextrose 50%)    STAT  PRN


 IV


 Hypoglycemia  1/18/18 12:00


 2/17/18 11:59  1/19/18 16:18


 


 


 Docusate Sodium


  (Colace)  100 mg  THREE TIMES A  DAY


 ORAL


   1/19/18 18:00


 2/18/18 17:59  1/24/18 12:28


 


 


 Furosemide


  (Lasix)  40 mg  EVERY 12  HOURS


 IV


   1/24/18 21:00


 2/23/18 20:59   


 


 


 Hydralazine HCl


  (Apresoline)  10 mg  BID


 ORAL


   1/24/18 18:00


 2/23/18 17:59   


 


 


 Insulin Aspart


  (NovoLOG)    BEFORE MEALS AND  HS


 SUBQ


   1/18/18 16:30


 2/17/18 16:29  1/24/18 11:26


 


 


 Isosorbide


 Mononitrate


  (Imdur)  30 mg  DAILY


 ORAL


   1/25/18 09:00


 2/24/18 08:59   


 


 


 Lansoprazole


  (Prevacid)  30 mg  DAILY


 ORAL


   1/18/18 17:15


 2/17/18 17:14  1/24/18 08:39


 


 


 Metoprolol


 Tartrate


  (Lopressor)  100 mg  Q12HR


 ORAL


   1/19/18 21:00


 2/18/18 20:59  1/24/18 08:40


 


 


 Ondansetron HCl


  (Zofran)  4 mg  Q4H  PRN


 IVP


 Nausea & Vomiting  1/18/18 12:00


 2/17/18 11:59   


 


 


 Potassium Chloride


  (K-Dur)  40 meq  TWICE A  DAY


 ORAL


   1/22/18 18:00


 2/21/18 17:59  1/24/18 08:40


 


 


 Tamsulosin HCl


  (Flomax)  0.4 mg  BEDTIME


 ORAL


   1/18/18 21:00


 2/17/18 20:59  1/23/18 21:56


 








Allergies:  


Coded Allergies:  


     NO KNOWN ALLERGIES (Verified  Allergy, Unknown, 1/18/18)


ROS Limited/Unobtainable:  No


Constitutional:  Reports: no symptoms


HEENT:  Reports: no symptoms


Cardiovascular:  Reports: no symptoms


Respiratory:  Reports: no symptoms


Gastrointestinal/Abdominal:  Reports: no symptoms


Genitourinary:  Reports: no symptoms


Neurologic/Psychiatric:  Reports: no symptoms


Subjective


68 YO M admitted with shortness of breath.  Now new cardiomyopathy, CHF and 

renal failure.  Await transfer to [facility] for cardiac cath.  Cover for 

[facility]-Dr Mcclendon.





Objective





Last Vital Signs








  Date Time  Temp Pulse Resp B/P (MAP) Pulse Ox O2 Delivery O2 Flow Rate FiO2


 


1/24/18 08:40  80  137/79    


 


1/24/18 08:00 97.0  18  99 Nasal Cannula 2.0 


 


1/24/18 00:00        28











Laboratory Tests








Test


  1/24/18


08:20


 


White Blood Count


  9.4 K/UL


(4.8-10.8)


 


Red Blood Count


  4.61 M/UL


(4.70-6.10)  L


 


Hemoglobin


  13.0 G/DL


(14.2-18.0)  L


 


Hematocrit


  42.8 %


(42.0-52.0)


 


Mean Corpuscular Volume 93 FL (80-99)  


 


Mean Corpuscular Hemoglobin


  28.3 PG


(27.0-31.0)


 


Mean Corpuscular Hemoglobin


Concent 30.4 G/DL


(32.0-36.0)  L


 


Red Cell Distribution Width


  18.1 %


(11.6-14.8)  H


 


Platelet Count


  152 K/UL


(150-450)


 


Mean Platelet Volume


  9.0 FL


(6.5-10.1)


 


Neutrophils (%) (Auto)


  79.7 %


(45.0-75.0)  H


 


Lymphocytes (%) (Auto)


  12.4 %


(20.0-45.0)  L


 


Monocytes (%) (Auto)


  7.3 %


(1.0-10.0)


 


Eosinophils (%) (Auto)


  0.0 %


(0.0-3.0)


 


Basophils (%) (Auto)


  0.6 %


(0.0-2.0)


 


Sodium Level


  143 MMOL/L


(136-145)


 


Potassium Level


  4.3 MMOL/L


(3.5-5.1)


 


Chloride Level


  102 MMOL/L


()


 


Carbon Dioxide Level


  31 MMOL/L


(21-32)


 


Anion Gap


  10 mmol/L


(5-15)


 


Blood Urea Nitrogen


  45 mg/dL


(7-18)  H


 


Creatinine


  1.3 MG/DL


(0.55-1.30)


 


Estimat Glomerular Filtration


Rate 55.2 mL/min


(>60)


 


Glucose Level


  100 MG/DL


()


 


Calcium Level


  9.4 MG/DL


(8.5-10.1)


 


Total Bilirubin


  2.1 MG/DL


(0.2-1.0)  H


 


Direct Bilirubin


  1.2 MG/DL


(0.0-0.3)  H


 


Aspartate Amino Transf


(AST/SGOT) 78 U/L (15-37)


H


 


Alanine Aminotransferase


(ALT/SGPT) 163 U/L


(12-78)  H


 


Alkaline Phosphatase


  185 U/L


()  H


 


Pro-B-Type Natriuretic Peptide


  8247 pg/mL


(0-125)  H


 


Total Protein


  7.6 G/DL


(6.4-8.2)


 


Albumin


  2.3 G/DL


(3.4-5.0)  L


 


Globulin 5.3 g/dL  


 


Albumin/Globulin Ratio


  0.4 (1.0-2.7)


L

















Intake and Output  


 


 1/23/18 1/24/18





 19:00 07:00


 


Intake Total 560 ml 


 


Output Total  400 ml


 


Balance 560 ml -400 ml


 


  


 


Intake Oral 260 ml 


 


IV Total 300 ml 


 


Output Urine Total  400 ml


 


# Voids 2 








Objective


General Appearance:  WD/WN, no apparent distress, alert


EENT:  PERRL/EOMI, normal ENT inspection


Neck:  non-tender, normal alignment, supple, normal inspection


Cardiovascular:  normal peripheral pulses, no gallop/murmur, no JVD, 

irregularly irregular


Respiratory/Chest:  chest wall non-tender, no accessory muscle use, respiratory 

distress, crackles/rales, rhonchi - bilaterally, expiratory wheezing


Abdomen:  normal bowel sounds, non tender, soft, no organomegaly, no mass


Extremities:  normal range of motion


Edema:  mild edema


Neurologic:  CNs II-XII grossly normal, no motor/sensory deficits


Skin:  normal pigmentation, warm/dry





Assessment/Plan


Problem List:  


(1) Hypercholesterolemia


Assessment & Plan:  Hold statin due to elevated Liver funct tests.





(2) Edema


(3) Elevated troponin


Assessment & Plan:  Troponin leak due to renal failure per cardiology-Dr Landaverde





(4) Cardiomyopathy


Assessment & Plan:  New onset.  EF=20%.  See cardiology note.  Continue lasix 

and eliquis.





(5) Atrial flutter


Assessment & Plan:  Will require transesophageal echo and ablation after 

cardiac cath-see cardiology note.  Continue lopressor per cardiology-Dr Landaverde





(6) Diabetes mellitus


Assessment & Plan:  Continue novolog sliding scale.





(7) Acute on chronic renal failure


Assessment & Plan:  See nephrology note- Dr Gutiérrez.





Assessment/Plan


Discharge planning:  Transfer to [facility] when bed available for cardiac cath-

see case management note.











KENDALL HUFF Jan 24, 2018 13:06
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
## 2018-01-24 NOTE — CARDIAC ELECTROPHYSIOLOGY PN
Assessment/Plan


Assessment/Plan


1. Troponin leak.  Due to renal failure and congestive heart failure.  His 

creatinine was 2.4.   Continue on Lipitor, metoprolol,


   and aspirin. Cardiac catheterization in view of also severely newly 

diagnosed cardiomyopathy and long runs of VT after renal fx improves.





2. Newly diagnosed cardiomyopathy, ejection fraction of only 20%.  BNP > 22328. 

Decrease Lasix to 40 mg IV b.i.d. Continue Lopressor


     Off  lisinopril and Aldactone for renal failure and hyperkalemia.Add 

Hydralazine and isordil  





3. Atrial flutter with rapid ventricular response.HR better on metoprolol 100 

mg b.i.d. and Eliquis 2.5 bid. 


      KAITLIN and atrial flutter ablation after cardiac cath. 





4. History of cocaine use.   





5. Renal failure.  Cr 2.4 now 1.3. F/U by Dr. Gutiérrez.


6. Diabetes.


7. Hyperlipidemia.





BONILLA RN and .


[facility]





Subjective


Subjective


In flutter with controlled rate. No chest pain or SOB. No VT overnight





Objective





Last 24 Hour Vital Signs








  Date Time  Temp Pulse Resp B/P (MAP) Pulse Ox O2 Delivery O2 Flow Rate FiO2


 


1/24/18 08:40  80  137/79    


 


1/24/18 08:00  88      


 


1/24/18 08:00 97.0  18 137/79 99 Nasal Cannula 2.0 


 


1/24/18 04:00 96.9  20 120/69 99 Nasal Cannula 2.0 


 


1/24/18 04:00  80      


 


1/24/18 00:00      Nasal Cannula 2.0 28


 


1/24/18 00:00 96.9 82 19 125/69 99 Nasal Cannula 2.0 


 


1/24/18 00:00     94 Nasal Cannula 2.0 28


 


1/23/18 23:40  83      


 


1/23/18 21:56  98  107/77    


 


1/23/18 20:48  102      


 


1/23/18 20:37 97.9 98 19 107/77 96 Nasal Cannula  


 


1/23/18 20:00     98 Nasal Cannula 2.0 


 


1/23/18 16:00 97.2 89 20 108/73 98 Nasal Cannula 2.0 


 


1/23/18 16:00  93      


 


1/23/18 12:00  80      


 


1/23/18 12:00 97.2 81 19 104/68 99 Nasal Cannula 2.0 

















Intake and Output  


 


 1/23/18 1/24/18





 19:00 07:00


 


Intake Total 560 ml 


 


Output Total  400 ml


 


Balance 560 ml -400 ml


 


  


 


Intake Oral 260 ml 


 


IV Total 300 ml 


 


Output Urine Total  400 ml


 


# Voids 2 











Laboratory Tests








Test


  1/23/18


10:45 1/24/18


08:20


 


Hepatitis A IgM Antibody Pending   


 


Hepatitis B Surface Antigen Pending   


 


Hepatitis B Core IgM Antibody Pending   


 


Hepatitis C Antibody Pending   


 


White Blood Count


  


  9.4 K/UL


(4.8-10.8)


 


Red Blood Count


  


  4.61 M/UL


(4.70-6.10)  L


 


Hemoglobin


  


  13.0 G/DL


(14.2-18.0)  L


 


Hematocrit


  


  42.8 %


(42.0-52.0)


 


Mean Corpuscular Volume  93 FL (80-99)  


 


Mean Corpuscular Hemoglobin


  


  28.3 PG


(27.0-31.0)


 


Mean Corpuscular Hemoglobin


Concent 


  30.4 G/DL


(32.0-36.0)  L


 


Red Cell Distribution Width


  


  18.1 %


(11.6-14.8)  H


 


Platelet Count


  


  152 K/UL


(150-450)


 


Mean Platelet Volume


  


  9.0 FL


(6.5-10.1)


 


Neutrophils (%) (Auto)


  


  79.7 %


(45.0-75.0)  H


 


Lymphocytes (%) (Auto)


  


  12.4 %


(20.0-45.0)  L


 


Monocytes (%) (Auto)


  


  7.3 %


(1.0-10.0)


 


Eosinophils (%) (Auto)


  


  0.0 %


(0.0-3.0)


 


Basophils (%) (Auto)


  


  0.6 %


(0.0-2.0)


 


Sodium Level


  


  143 MMOL/L


(136-145)


 


Potassium Level


  


  4.3 MMOL/L


(3.5-5.1)


 


Chloride Level


  


  102 MMOL/L


()


 


Carbon Dioxide Level


  


  31 MMOL/L


(21-32)


 


Anion Gap


  


  10 mmol/L


(5-15)


 


Blood Urea Nitrogen


  


  45 mg/dL


(7-18)  H


 


Creatinine


  


  1.3 MG/DL


(0.55-1.30)


 


Estimat Glomerular Filtration


Rate 


  55.2 mL/min


(>60)


 


Glucose Level


  


  100 MG/DL


()


 


Calcium Level


  


  9.4 MG/DL


(8.5-10.1)


 


Total Bilirubin


  


  2.1 MG/DL


(0.2-1.0)  H


 


Direct Bilirubin


  


  1.2 MG/DL


(0.0-0.3)  H


 


Aspartate Amino Transf


(AST/SGOT) 


  78 U/L (15-37)


H


 


Alanine Aminotransferase


(ALT/SGPT) 


  163 U/L


(12-78)  H


 


Alkaline Phosphatase


  


  185 U/L


()  H


 


Pro-B-Type Natriuretic Peptide


  


  8247 pg/mL


(0-125)  H


 


Total Protein


  


  7.6 G/DL


(6.4-8.2)


 


Albumin


  


  2.3 G/DL


(3.4-5.0)  L


 


Globulin  5.3 g/dL  


 


Albumin/Globulin Ratio


  


  0.4 (1.0-2.7)


L








Objective


HEAD AND NECK:   Mild jugular venous distention.


LUNGS:  Decreased breath sounds.


CARDIOVASCULAR:  Irregularly irregular S1 and S2 with no gallop or murmur.


ABDOMEN:  Soft.


EXTREMITIES:  2+ pitting edema.











ANNA IBRAHIM Jan 24, 2018 10:47

## 2018-01-24 NOTE — NEPHROLOGY PROGRESS NOTE
Assessment/Plan


Problem List:  


(1) Acute on chronic renal failure


(2) Cardiomyopathy


(3) Atrial flutter


(4) Pleural effusion


(5) Diabetes mellitus


Assessment





Urine positive for cocaine


Low K corrected





Renal failure- Acute vs Chronic OR Acute superimposed on chronic Cr down 1.3


DM ? Nephropathy


Atrial Flutter-


Pleural effusion / Bilateral LE edema


Cardiomyopathy


Plan





Plan:


K supplement mag supplement as needed


gentle diuresis


optimize cardiac status


2D Echo 20% EjFx


Kidney RICHARD WNL


flomax


gastric support


monitor renal parameters


Avoid Nephrotoxics


Hold Metformin


DC statins in view of high LFTs





Subjective


ROS Limited/Unobtainable:  No





Objective


Objective





Last 24 Hour Vital Signs








  Date Time  Temp Pulse Resp B/P (MAP) Pulse Ox O2 Delivery O2 Flow Rate FiO2


 


1/24/18 08:40  80  137/79    


 


1/24/18 08:00  88      


 


1/24/18 08:00 97.0  18 137/79 99 Nasal Cannula 2.0 


 


1/24/18 04:00 96.9  20 120/69 99 Nasal Cannula 2.0 


 


1/24/18 04:00  80      


 


1/24/18 00:00      Nasal Cannula 2.0 28


 


1/24/18 00:00 96.9 82 19 125/69 99 Nasal Cannula 2.0 


 


1/24/18 00:00     94 Nasal Cannula 2.0 28


 


1/23/18 23:40  83      


 


1/23/18 21:56  98  107/77    


 


1/23/18 20:48  102      


 


1/23/18 20:37 97.9 98 19 107/77 96 Nasal Cannula  


 


1/23/18 20:00     98 Nasal Cannula 2.0 


 


1/23/18 16:00 97.2 89 20 108/73 98 Nasal Cannula 2.0 


 


1/23/18 16:00  93      


 


1/23/18 12:00  80      


 


1/23/18 12:00 97.2 81 19 104/68 99 Nasal Cannula 2.0 

















Intake and Output  


 


 1/23/18 1/24/18





 19:00 07:00


 


Intake Total 560 ml 


 


Output Total  400 ml


 


Balance 560 ml -400 ml


 


  


 


Intake Oral 260 ml 


 


IV Total 300 ml 


 


Output Urine Total  400 ml


 


# Voids 2 








Laboratory Tests


1/23/18 10:45: 


Hepatitis A IgM Antibody [Pending], Hepatitis B Surface Antigen [Pending], 

Hepatitis B Core IgM Antibody [Pending], Hepatitis C Antibody [Pending]


1/24/18 08:20: 


White Blood Count 9.4, Red Blood Count 4.61L, Hemoglobin 13.0L, Hematocrit 42.8

, Mean Corpuscular Volume 93, Mean Corpuscular Hemoglobin 28.3, Mean 

Corpuscular Hemoglobin Concent 30.4L, Red Cell Distribution Width 18.1H, 

Platelet Count 152, Mean Platelet Volume 9.0, Neutrophils (%) (Auto) 79.7H, 

Lymphocytes (%) (Auto) 12.4L, Monocytes (%) (Auto) 7.3, Eosinophils (%) (Auto) 

0.0, Basophils (%) (Auto) 0.6, Sodium Level 143, Potassium Level 4.3, Chloride 

Level 102, Carbon Dioxide Level 31, Anion Gap 10, Blood Urea Nitrogen 45H, 

Creatinine 1.3, Estimat Glomerular Filtration Rate 55.2, Glucose Level 100, 

Calcium Level 9.4, Total Bilirubin 2.1H, Direct Bilirubin 1.2H, Aspartate Amino 

Transf (AST/SGOT) 78H, Alanine Aminotransferase (ALT/SGPT) 163H, Alkaline 

Phosphatase 185H, Pro-B-Type Natriuretic Peptide 8247H, Total Protein 7.6, 

Albumin 2.3L, Globulin 5.3, Albumin/Globulin Ratio 0.4L


Height (Feet):  5


Height (Inches):  11.00


Weight (Pounds):  157


General Appearance:  no apparent distress


Respiratory/Chest:  decreased breath sounds


Abdomen:  soft


Objective


no change-











IVANA CHAIDEZ Jan 24, 2018 10:28

## 2018-01-24 NOTE — DIAGNOSTIC IMAGING REPORT
Indication: Abnormal liver function tests and renal function tests

 

Technique: Gray-scale and duplex images of the upper abdomen were obtained

 

Comparison: 1/18/2018 renal ultrasound

 

Findings: Gallbladder demonstrates sludge. No stones, wall thickening, nor

pericholecystic fluid Common bile duct measures 4 mm in diameter.  No intrahepatic

biliary ductal dilatation.  Liver demonstrates normal echogenicity, no focal

abnormality.   Portal vein and hepatic veins are patent.   Pancreas is unremarkable. 

  Spleen is unremarkable.  Left kidney measures 12.1 cm in length.  Right kidney

measures 11.9 cm length.  Both kidneys demonstrate normal echogenicity.  There is no

hydronephrosis.   The left kidney demonstrates a 2.8 cm cyst in lower pole, also

demonstrated on recent renal ultrasound. No focal abnormality on the right .

Non-aneurysmal abdominal aorta .

 

Impression: Gallbladder sludge. Negative for gallstones or dilated ducts

 

Incidental finding left lower pole renal cyst, also previously reported

## 2018-01-25 VITALS — DIASTOLIC BLOOD PRESSURE: 78 MMHG | SYSTOLIC BLOOD PRESSURE: 147 MMHG

## 2018-01-26 NOTE — DISCHARGE SUMMARY
Discharge Summary


Hospital Course


Date of Admission


Jan 18, 2018 at 07:53


Date of Discharge


Jan 25, 2018 at 01:23


Admitting Diagnosis





HPI


Gulshan Alan Sr is a 67 year old male who was admitted on Jan 18, 2018 at 

07:53 for Atrial Flutter


Hospital Course


[id number]





Discharge


Discharge Disposition


[facility]


Discharge Diagnoses:  











Liz Palma NP Jan 26, 2018 17:39

## 2018-01-27 NOTE — DISCHARGE SUMMARY 2 SIG
DATE OF ADMISSION:  01/18/2018



DATE OF DISCHARGE:  01/25/2018



CONSULTANTS:

1. Sukumar Landaverde M.D.

2. Rudy Olson M.D.

3. Junior Gutiérrez M.D.



BRIEF HOSPITAL COURSE:  The patient is a 67-year-old  male,

who presented to ED complaining of shortness of breath that started two

weeks prior to admission accompanied with leg swelling.  He initially

presented to [facility] emergency room where he was found to be in

atrial flutter with 2 to 1 conduction.  He was transferred to [facility] for insurance purposes and was admitted for congestive

heart failure and atrial flutter.  He was continued on Lipitor and blood

glucose was monitored.  He underwent cardiac evaluation with Dr. Landaverde.

The patient has no prior history of coronary artery disease or congestive

heart failure.  He has history of diabetes and hypertension and admits to

cocaine use.  He underwent echocardiogram that showed ejection fraction of

only 20% to 25%.  Urine toxicology was positive for cocaine.  Troponin was

elevated to 0.991.  Creatinine was 2.4.  He was given Lipitor, metoprolol,

and aspirin.  Lisinopril and Aldactone was placed on hold in lieu of renal

failure and hyperkalemia.  He had atrial flutter.  Digoxin was added to

his medical regimen.  He was also placed on Xarelto.  He had a renal

ultrasound done that showed no urinary stone and no hydronephrosis

bilaterally.  Renal echogenicity was within normal limits.  Venous duplex

showed a recanalized chronic thrombus in the right leg.  Left leg was

negative for DVT.  Metformin was placed on hold and was given insulin

sliding scale.  LFTs were elevated.  Lipitor was discontinued.  BNP was

greater than 35,000.  He was given Lasix IV.  The patient with severely

newly diagnosed cardiomyopathy and had long runs of ventricular

tachycardia.  [facility] for cardiac

[facility]atheterization.



FINAL DIAGNOSES:

1. Severely and newly diagnosed cardiomyopathy.

2. Troponin leak due to renal failure and congestive heart failure.

3. Acute renal failure.

4. Hyperlipidemia.

5. Atrial flutter with rapid ventricular response.

6. Cocaine abuse.

7. Diabetes mellitus.

8. Pleural effusion.

9. Diabetes mellitus with possible diabetic nephropathy.

10. Bilateral lower extremity edema.



DISPOSITION:  The patient was transferred to [facility] for cardiac

catheterization.







  ______________________________________________

  Dejon Mcclendon M.D.



I have been assigned to dictate discharge summary on this account and I

was not involved in the patient's management.



  ______________________________________________

  Liz Palma N.P.





DR:  MALLOYR

D:  01/26/2018 17:39

T:  01/27/2018 01:20

JOB#:  [id number]

CC:

## 2018-01-29 NOTE — CARDIOLOGY REPORT
--------------- APPROVED REPORT --------------





EXAM: Two-dimensional and M-mode echocardiogram with Doppler and color 

Doppler.



INDICATION

Atrial flutter



M-Mode DIMENSIONS 

IVSd1.4 (0.7-1.1cm)Left Atrium (MM)5.0 (1.6-4.0cm)

LVDd5.2 (3.5-5.6cm)Aortic Root2.6 (2.0-3.7cm)

PWd1.3 (0.7-1.1cm)Aortic Cusp Exc.1.5 (1.5-2.0cm)



LVDs4.8 (2.5-4.0cm)

PWs1.4 cm





Technically limited and difficult study due to poor acoustical windows.

Mild left ventricular enlargement.

Severe global left ventricular hypokinesis. Reading and septum are dyskinetic.

Left ventricular ejection fraction estimated to be less than 20 %.

No evidence of  left ventricular hypertrophy.

No evidence of pericardial or pleural effusion.

Moderate left atrial enlargement by 2D.

Mild right atrial enlargement by 2D.

Focal aortic valve sclerosis with adequate cusp excursion.

Thickened mitral valve leaflets with normal excursion.

Normal mild mitral annulus and aortic root.

Pulmonic valve is well visualized.

Normal tricuspid valve structure.

IVC is normal in size and collapsible with respiration.

Echo density seen in Reading (Thrombus).



A  color flow and spectral Doppler study was performed and revealed:

No aortic regurgitation.

Moderate mitral regurgitation (2 jets).

Normal mitral diastolic function.

Moderate tricuspid regurgitation.

Tricuspid  systolic velocities suggests peak right ventricular systolic pressure of  22 

mmHg

Pulmonic regurgitation present.



Clinical criteria: REJI Laguna and Dr. Piper have been notified on 1/18/18 at 3pm

## 2018-01-31 NOTE — CARDIOLOGY REPORT
--------------- APPROVED REPORT --------------





EKG Measurement

Heart [id number]

CA [id number]

[id number]

[id number]

[id number]





DB Newman.



Abnormal ECG